# Patient Record
Sex: FEMALE | HISPANIC OR LATINO | Employment: OTHER | URBAN - METROPOLITAN AREA
[De-identification: names, ages, dates, MRNs, and addresses within clinical notes are randomized per-mention and may not be internally consistent; named-entity substitution may affect disease eponyms.]

---

## 2019-11-11 ENCOUNTER — APPOINTMENT (OUTPATIENT)
Dept: RADIOLOGY | Facility: MEDICAL CENTER | Age: 64
End: 2019-11-11
Attending: EMERGENCY MEDICINE
Payer: MEDICARE

## 2019-11-11 ENCOUNTER — HOSPITAL ENCOUNTER (EMERGENCY)
Facility: MEDICAL CENTER | Age: 64
End: 2019-11-11
Attending: EMERGENCY MEDICINE
Payer: MEDICARE

## 2019-11-11 VITALS
HEART RATE: 65 BPM | DIASTOLIC BLOOD PRESSURE: 101 MMHG | SYSTOLIC BLOOD PRESSURE: 148 MMHG | BODY MASS INDEX: 29.02 KG/M2 | OXYGEN SATURATION: 95 % | TEMPERATURE: 97.5 F | WEIGHT: 163.8 LBS | HEIGHT: 63 IN | RESPIRATION RATE: 16 BRPM

## 2019-11-11 DIAGNOSIS — S29.9XXA INJURY OF CHEST WALL, INITIAL ENCOUNTER: ICD-10-CM
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
## 2019-11-11 PROCEDURE — 99284 EMERGENCY DEPT VISIT MOD MDM: CPT

## 2019-11-11 PROCEDURE — 700102 HCHG RX REV CODE 250 W/ 637 OVERRIDE(OP): Performed by: EMERGENCY MEDICINE

## 2019-11-11 PROCEDURE — 71045 X-RAY EXAM CHEST 1 VIEW: CPT

## 2019-11-11 PROCEDURE — A9270 NON-COVERED ITEM OR SERVICE: HCPCS | Performed by: EMERGENCY MEDICINE

## 2019-11-11 RX ORDER — HYDROCODONE BITARTRATE AND ACETAMINOPHEN 5; 325 MG/1; MG/1
1 TABLET ORAL ONCE
Status: COMPLETED | OUTPATIENT
Start: 2019-11-11 | End: 2019-11-11

## 2019-11-11 RX ORDER — MELOXICAM 7.5 MG/1
7.5 TABLET ORAL DAILY
Qty: 30 TAB | Refills: 0 | Status: SHIPPED | OUTPATIENT
Start: 2019-11-11 | End: 2023-01-04

## 2019-11-11 RX ADMIN — HYDROCODONE BITARTRATE AND ACETAMINOPHEN 1 TABLET: 5; 325 TABLET ORAL at 16:03
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
## 2019-11-11 NOTE — ED PROVIDER NOTES
ED Provider Note    CHIEF COMPLAINT  Chief Complaint   Patient presents with   • T-5000 GLF     just got up from sleep and tripped on ottoman and fell onto chest, pain in right chest wall/breast       HPI  Roxy Sandhu is a 64 y.o. female who presents for evaluation after ground-level fall.  The patient is here with her .  She states she got out of bed and tripped on the ottoman falling and striking the right side of her chest.  She is complaining of right-sided chest wall pain.  She had no loss of consciousness.  She states she did not strike her head.    REVIEW OF SYSTEMS  See HPI for further details. All other systems negative.    PAST MEDICAL HISTORY  Past Medical History:   Diagnosis Date   • Breast cancer (HCC)    • Breath shortness    • Cancer (HCC) 2011    breast       FAMILY HISTORY  History reviewed. No pertinent family history.    SOCIAL HISTORY  Social History     Socioeconomic History   • Marital status: Single     Spouse name: Not on file   • Number of children: Not on file   • Years of education: Not on file   • Highest education level: Not on file   Occupational History   • Not on file   Social Needs   • Financial resource strain: Not on file   • Food insecurity:     Worry: Not on file     Inability: Not on file   • Transportation needs:     Medical: Not on file     Non-medical: Not on file   Tobacco Use   • Smoking status: Never Smoker   • Smokeless tobacco: Never Used   Substance and Sexual Activity   • Alcohol use: Yes     Comment: Occasional    • Drug use: No   • Sexual activity: Not on file   Lifestyle   • Physical activity:     Days per week: Not on file     Minutes per session: Not on file   • Stress: Not on file   Relationships   • Social connections:     Talks on phone: Not on file     Gets together: Not on file     Attends Sikh service: Not on file     Active member of club or organization: Not on file     Attends meetings of clubs or organizations: Not on file      "Relationship status: Not on file   • Intimate partner violence:     Fear of current or ex partner: Not on file     Emotionally abused: Not on file     Physically abused: Not on file     Forced sexual activity: Not on file   Other Topics Concern   • Not on file   Social History Narrative   • Not on file       SURGICAL HISTORY  Past Surgical History:   Procedure Laterality Date   • LUMPECTOMY  9/2/2011    Performed by DOREEN GRACIA at SURGERY SAME DAY [facility]   • NODE BIOPSY SENTINEL  9/2/2011    Performed by DOREEN GARCIA at SURGERY SAME DAY [facility]       CURRENT MEDICATIONS  Home Medications     Reviewed by Sam Armstrong R.N. (Registered Nurse) on 11/11/19 at 1523  Med List Status: Complete   Medication Last Dose Status        Patient Mason Taking any Medications                       ALLERGIES  Allergies   Allergen Reactions   • No Known Drug Allergy        PHYSICAL EXAM  VITAL SIGNS: /97   Pulse 83   Temp 36.4 °C (97.5 °F) (Temporal)   Resp 17   Ht 1.6 m (5' 3\")   Wt 74.3 kg (163 lb 12.8 oz)   SpO2 97%   BMI 29.02 kg/m²   Constitutional: Well developed, Well nourished.   HENT: Normocephalic, Atraumatic.  Eyes: EOMI, Conjunctiva normal, No discharge.   Cardiovascular: Normal heart rate, Normal rhythm, No murmurs, No rubs, No gallops.   Thorax & Lungs: Clear to auscultation without wheezes, rales, or rhonchi.  Tender to palpation of the right lateral chest wall with no crepitance noted.  Abdomen: Soft nontender.  Skin: Warm, Dry.  Musculoskeletal: Good range of motion in all major joints.   Neurologic: Awake and alert.  RADIOLOGY/PROCEDURES  DX-CHEST-PORTABLE (1 VIEW)   Final Result      No acute cardiopulmonary abnormality.            COURSE & MEDICAL DECISION MAKING  Pertinent Labs & Imaging studies reviewed. (See chart for details)  This is a 64-year-old here for evaluation after ground-level fall.  The patient tripped at home and fell injuring her right chest.  On exam she does " have some tenderness in the lateral chest wall.  No crepitance.  She is not hypoxemic.  She appears neurologically intact.  Her breath sounds appear full.  Chest x-ray shows no acute cardiopulmonary processes.  I discussed the results of the study with the patient and her .  She is treated with Norco here with improvement.  At this point I suspect she has a chest wall injury likely a bruise but possibility of a nondisplaced fracture exists.  I will provide her a prescription for meloxicam.  She will follow-up with her doctor as needed.  She is given a discharge instruction sheet on chest wall injuries.    FINAL IMPRESSION  1.  Mechanical ground-level fall  2.  Chest wall injury  3.         Electronically signed by: Junaid Laird, 11/11/2019 3:55 PM

## 2019-11-12 NOTE — ED NOTES
Given discharge instructions, verbalized understanding, left with all belongings, wheeled to ED lobby by .
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
## 2022-12-06 ENCOUNTER — HOSPITAL ENCOUNTER (OUTPATIENT)
Dept: RADIOLOGY | Facility: MEDICAL CENTER | Age: 67
End: 2022-12-06
Attending: OPHTHALMOLOGY
Payer: MEDICARE

## 2022-12-06 DIAGNOSIS — H47.013 ISCHEMIC OPTIC NEUROPATHY OF BOTH EYES: ICD-10-CM

## 2022-12-06 DIAGNOSIS — H46.13 RETROBULBAR NEURITIS OF BOTH EYES: ICD-10-CM

## 2022-12-06 PROCEDURE — 70540 MRI ORBIT/FACE/NECK W/O DYE: CPT

## 2022-12-21 ENCOUNTER — HOSPITAL ENCOUNTER (OUTPATIENT)
Dept: LAB | Facility: MEDICAL CENTER | Age: 67
End: 2022-12-21
Attending: OPHTHALMOLOGY
Payer: MEDICARE

## 2022-12-21 ENCOUNTER — OFFICE VISIT (OUTPATIENT)
Dept: OPHTHALMOLOGY | Facility: MEDICAL CENTER | Age: 67
End: 2022-12-21
Payer: MEDICARE

## 2022-12-21 DIAGNOSIS — H46.9 OPTIC NEURITIS: ICD-10-CM

## 2022-12-21 DIAGNOSIS — H46.9 OPTIC NEUROPATHY: ICD-10-CM

## 2022-12-21 DIAGNOSIS — Z96.1 PSEUDOPHAKIA OF LEFT EYE: ICD-10-CM

## 2022-12-21 DIAGNOSIS — H35.3190 AGE-RELATED MACULAR DEGENERATION WITH CENTRAL GEOGRAPHIC ATROPHY: ICD-10-CM

## 2022-12-21 LAB
ALBUMIN SERPL BCP-MCNC: 4.6 G/DL (ref 3.2–4.9)
ALBUMIN/GLOB SERPL: 1.1 G/DL
ALP SERPL-CCNC: 66 U/L (ref 30–99)
ALT SERPL-CCNC: 10 U/L (ref 2–50)
ANION GAP SERPL CALC-SCNC: 13 MMOL/L (ref 7–16)
AST SERPL-CCNC: 24 U/L (ref 12–45)
BASOPHILS # BLD AUTO: 0.4 % (ref 0–1.8)
BASOPHILS # BLD: 0.02 K/UL (ref 0–0.12)
BILIRUB SERPL-MCNC: 0.6 MG/DL (ref 0.1–1.5)
BUN SERPL-MCNC: 12 MG/DL (ref 8–22)
CALCIUM ALBUM COR SERPL-MCNC: 9.4 MG/DL (ref 8.5–10.5)
CALCIUM SERPL-MCNC: 9.9 MG/DL (ref 8.5–10.5)
CHLORIDE SERPL-SCNC: 102 MMOL/L (ref 96–112)
CO2 SERPL-SCNC: 25 MMOL/L (ref 20–33)
CREAT SERPL-MCNC: 1.01 MG/DL (ref 0.5–1.4)
CRP SERPL HS-MCNC: <0.3 MG/DL (ref 0–0.75)
EOSINOPHIL # BLD AUTO: 0.04 K/UL (ref 0–0.51)
EOSINOPHIL NFR BLD: 0.8 % (ref 0–6.9)
ERYTHROCYTE [DISTWIDTH] IN BLOOD BY AUTOMATED COUNT: 50.1 FL (ref 35.9–50)
ERYTHROCYTE [SEDIMENTATION RATE] IN BLOOD BY WESTERGREN METHOD: 30 MM/HOUR (ref 0–25)
GFR SERPLBLD CREATININE-BSD FMLA CKD-EPI: 62 ML/MIN/1.73 M 2
GLOBULIN SER CALC-MCNC: 4.2 G/DL (ref 1.9–3.5)
GLUCOSE SERPL-MCNC: 90 MG/DL (ref 65–99)
HCT VFR BLD AUTO: 40 % (ref 37–47)
HGB BLD-MCNC: 13 G/DL (ref 12–16)
IMM GRANULOCYTES # BLD AUTO: 0.02 K/UL (ref 0–0.11)
IMM GRANULOCYTES NFR BLD AUTO: 0.4 % (ref 0–0.9)
LYMPHOCYTES # BLD AUTO: 1.24 K/UL (ref 1–4.8)
LYMPHOCYTES NFR BLD: 23.4 % (ref 22–41)
MCH RBC QN AUTO: 31.9 PG (ref 27–33)
MCHC RBC AUTO-ENTMCNC: 32.5 G/DL (ref 33.6–35)
MCV RBC AUTO: 98.3 FL (ref 81.4–97.8)
MONOCYTES # BLD AUTO: 0.45 K/UL (ref 0–0.85)
MONOCYTES NFR BLD AUTO: 8.5 % (ref 0–13.4)
NEUTROPHILS # BLD AUTO: 3.54 K/UL (ref 2–7.15)
NEUTROPHILS NFR BLD: 66.5 % (ref 44–72)
NRBC # BLD AUTO: 0 K/UL
NRBC BLD-RTO: 0 /100 WBC
PLATELET # BLD AUTO: 240 K/UL (ref 164–446)
PMV BLD AUTO: 9.4 FL (ref 9–12.9)
POTASSIUM SERPL-SCNC: 4.3 MMOL/L (ref 3.6–5.5)
PROT SERPL-MCNC: 8.8 G/DL (ref 6–8.2)
RBC # BLD AUTO: 4.07 M/UL (ref 4.2–5.4)
SODIUM SERPL-SCNC: 140 MMOL/L (ref 135–145)
T PALLIDUM AB SER QL IA: NORMAL
WBC # BLD AUTO: 5.3 K/UL (ref 4.8–10.8)

## 2022-12-21 PROCEDURE — 86039 ANTINUCLEAR ANTIBODIES (ANA): CPT

## 2022-12-21 PROCEDURE — 82164 ANGIOTENSIN I ENZYME TEST: CPT

## 2022-12-21 PROCEDURE — 86362 MOG-IGG1 ANTB CBA EACH: CPT

## 2022-12-21 PROCEDURE — 86780 TREPONEMA PALLIDUM: CPT | Mod: GA

## 2022-12-21 PROCEDURE — 86051 AQUAPORIN-4 ANTB ELISA: CPT

## 2022-12-21 PROCEDURE — 92250 FUNDUS PHOTOGRAPHY W/I&R: CPT | Performed by: OPHTHALMOLOGY

## 2022-12-21 PROCEDURE — 99205 OFFICE O/P NEW HI 60 MIN: CPT | Mod: 25 | Performed by: OPHTHALMOLOGY

## 2022-12-21 PROCEDURE — 80053 COMPREHEN METABOLIC PANEL: CPT

## 2022-12-21 PROCEDURE — 84479 ASSAY OF THYROID (T3 OR T4): CPT | Mod: GA

## 2022-12-21 PROCEDURE — 86140 C-REACTIVE PROTEIN: CPT

## 2022-12-21 PROCEDURE — 36415 COLL VENOUS BLD VENIPUNCTURE: CPT | Mod: GA

## 2022-12-21 PROCEDURE — 85652 RBC SED RATE AUTOMATED: CPT

## 2022-12-21 PROCEDURE — 85025 COMPLETE CBC W/AUTO DIFF WBC: CPT

## 2022-12-21 PROCEDURE — 84436 ASSAY OF TOTAL THYROXINE: CPT | Mod: GA

## 2022-12-21 PROCEDURE — 86038 ANTINUCLEAR ANTIBODIES: CPT

## 2022-12-21 ASSESSMENT — REFRACTION_MANIFEST
OS_CYLINDER: +0.75
OD_AXIS: 070
METHOD_AUTOREFRACTION: 1
OS_SPHERE: -0.50
OD_SPHERE: +0.25
OS_AXIS: 132
OD_CYLINDER: +0.50

## 2022-12-21 ASSESSMENT — VISUAL ACUITY
OD_SC: NLP
OS_SC+: -3
METHOD: SNELLEN - LINEAR
OS_SC: 20/25

## 2022-12-21 ASSESSMENT — CONF VISUAL FIELD
OD_SUPERIOR_TEMPORAL_RESTRICTION: 1
OS_SUPERIOR_TEMPORAL_RESTRICTION: 0
OS_INFERIOR_TEMPORAL_RESTRICTION: 0
OS_NORMAL: 1
OD_INFERIOR_NASAL_RESTRICTION: 1
OS_SUPERIOR_NASAL_RESTRICTION: 0
OD_SUPERIOR_NASAL_RESTRICTION: 1
OS_INFERIOR_NASAL_RESTRICTION: 0
OD_INFERIOR_TEMPORAL_RESTRICTION: 1

## 2022-12-21 ASSESSMENT — SLIT LAMP EXAM - LIDS
COMMENTS: NORMAL
COMMENTS: NORMAL

## 2022-12-21 ASSESSMENT — TONOMETRY
OD_IOP_MMHG: 22
OS_IOP_MMHG: 22
IOP_METHOD: I-CARE

## 2022-12-21 ASSESSMENT — EXTERNAL EXAM - RIGHT EYE: OD_EXAM: NORMAL

## 2022-12-21 ASSESSMENT — ENCOUNTER SYMPTOMS
HEADACHES: 1
BLURRED VISION: 1

## 2022-12-21 ASSESSMENT — EXTERNAL EXAM - LEFT EYE: OS_EXAM: NORMAL

## 2022-12-21 ASSESSMENT — CUP TO DISC RATIO: OD_RATIO: 0.2

## 2022-12-21 NOTE — PROGRESS NOTES
Peds/Neuro Ophthalmology:   John Pantoja M.D.    Date & Time note created:    12/21/2022   4:10 PM     Referring MD / APRN:  Jagruti John D.O., No att. providers found    Patient ID:  Name:             Roxy Sandhu     YOB: 1955  Age:                 67 y.o.  female   MRN:               [id number]    Chief Complaint/Reason for Visit:     Other (New patient for NAION in the right eye)      History of Present Illness:    Roxy Sandhu is a 67 y.o. female   New patient for NAION in the right eye. Pt is with  today. Patient states vision in the left eye is stable after cataract surgery but the right eye she has no vision since 11/05/2022. Pt was told she possibly had a stroke. Pt has seen Retina Doctor at [facility]. Pt denies pain or discomfort in both eyes. Pt does have headaches mainly feels them on the side of head. Headaches is worse when she is cold.       Review of Systems:  Review of Systems   Eyes:  Positive for blurred vision.        NAION in the right eye   Neurological:  Positive for headaches.   All other systems reviewed and are negative.    Past Medical History:   Past Medical History:   Diagnosis Date    Breast cancer (HCC)     Breath shortness     Cancer (HCC) 2011    breast       Past Surgical History:  Past Surgical History:   Procedure Laterality Date    LUMPECTOMY  09/02/2011    Performed by DOREEN GARCIA at SURGERY SAME DAY [facility]    NODE BIOPSY SENTINEL  09/02/2011    Performed by DOREEN GARCIA at SURGERY SAME DAY [facility]    EYE SURGERY         Current Outpatient Medications:  Current Outpatient Medications   Medication Sig Dispense Refill    meloxicam (MOBIC) 7.5 MG Tab Take 1 Tab by mouth every day. (Patient not taking: Reported on 12/21/2022) 30 Tab 0     No current facility-administered medications for this visit.       Allergies:  Allergies   Allergen Reactions    No Known Drug Allergy        Family History:  Family  History   Problem Relation Age of Onset    Diabetes Mother        Social History:  Social History     Socioeconomic History    Marital status: Single     Spouse name: Not on file    Number of children: Not on file    Years of education: Not on file    Highest education level: Not on file   Occupational History    Not on file   Tobacco Use    Smoking status: Never    Smokeless tobacco: Never   Vaping Use    Vaping Use: Never used   Substance and Sexual Activity    Alcohol use: Not Currently     Comment: Occasional     Drug use: Not Currently     Types: Marijuana    Sexual activity: Not on file   Other Topics Concern    Not on file   Social History Narrative    disabled     Social Determinants of Health     Financial Resource Strain: Not on file   Food Insecurity: Not on file   Transportation Needs: Not on file   Physical Activity: Not on file   Stress: Not on file   Social Connections: Not on file   Intimate Partner Violence: Not on file   Housing Stability: Not on file          Physical Exam:  Physical Exam    Oriented x 3  Weight/BMI: There is no height or weight on file to calculate BMI.  There were no vitals taken for this visit.    Base Eye Exam       Visual Acuity (Snellen - Linear)         Right Left    Dist sc NLP 20/25 -3    Dist ph sc NI NI              Tonometry (i-care, 8:30 AM)         Right Left    Pressure 22 22              Pupils         Shape APD    Right Round +3    Left Round               Visual Fields         Right Left      Full                                Extraocular Movement         Right Left     Full Full              Neuro/Psych       Oriented x3: Yes    Mood/Affect: difficult historian              Dilation       Both eyes: Tropicamide (MYDRIACYL) 1% ophthalmic solution, Phenylephrine (NEOSYNEPHRINE) ophthalmic solution 2.5%, Cyclopentolate (CYCLOGYL) 1% ophthalmic solution                   Additional Tests       Color         Right Left    Ishihara 0/12 12/12              Stereo        Fly: -    Animals: 0/3    Circles: 0/9                  Slit Lamp and Fundus Exam       External Exam         Right Left    External Normal Normal              Slit Lamp Exam         Right Left    Lids/Lashes Normal Normal    Conjunctiva/Sclera White and quiet White and quiet    Cornea Clear Clear    Anterior Chamber Deep and quiet Deep and quiet    Iris Round and reactive Round and reactive    Lens Clear Posterior chamber intraocular lens    Vitreous Normal Normal              Fundus Exam         Right Left    Disc Normal Tilted, with chichi pap staphyloma    C/D Ratio 0.2     Macula Normal RPE degeneration    Vessels Normal Normal    Periphery Normal Normal                  Refraction       Manifest Refraction (Auto)         Sphere Cylinder Axis    Right +0.25 +0.50 070    Left -0.50 +0.75 132                    Pertinent Lab/Test/Imaging Review:  Notes from [facility].  Review of MRI reports and images    Assessment and Plan:     Optic neuritis  12/21/2022-referred by [facility] for acute vision loss in the right November 5.  She was then seen at [facility] as well by Dr. 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  However he did not notice any disc edema.  She did undergo an MRI scan that was unfortunately done without contrast that demonstrated some optic nerve sheath dilation.  On examination today her visual acuity is light perception in the right eye and there is a right a fair pupillary defect.  An OCT of the optic nerve thickness is normal at 89 in the right and 72 in the left with normal ganglion cell on the right.  Therefore this history is not consistent with a nonarteritic ischemic optic neuropathy and that went over the expected continued swelling noted on dilated examination 10 days later.  This could easily be consistent with a an acute central retinal artery occlusion.  Trevor York did a fluorescein  angiogram but I do not have any results to that effect.  However given the optic nerve sheath dilation on MRI scan this could be consistent with a retrobulbar optic neuritis.  However the MRI scan was done without contrast so one does not know where the optic nerve would enhance.  I am therefore we are ordering an MRI scan with gadolinium as well as ordering routine blood work for which she has not done in a while.  Also to obtain further serological testing as well as CT angiogram of the head and neck.  Sending a note back to Dr. York insofar as if he never did a fluorescein angiogram this might help in securing a diagnosis.    Pseudophakia of left eye  12/21/2022-pseudophakia left eye.  IOL intact    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  12/21/2022-significantly tilted optic nerve on the left with geographic atrophy.  OCT demonstrates significant RPE changes as well as macular hole.  We will relay this information to Dr. York.  Uncertain as to whether highly myopic prior to cataract extraction on the left    Greater than 60 minute were spent examining the patient, reviewing records from referring providers including MRI images if available and records within the [facility]IC system, counselling the patient and family regarding the examination findings, diagnostic testing preformed, recommendations for management, prognosis, further testing and referrals as appropriate and follow up. This in addition to time spent interpreting separate billable tests done during the visit.      John Pantoja M.D.

## 2022-12-22 ENCOUNTER — HOSPITAL ENCOUNTER (OUTPATIENT)
Dept: RADIOLOGY | Facility: MEDICAL CENTER | Age: 67
End: 2022-12-22
Attending: OPHTHALMOLOGY
Payer: MEDICARE

## 2022-12-22 ENCOUNTER — HOSPITAL ENCOUNTER (OUTPATIENT)
Dept: LAB | Facility: MEDICAL CENTER | Age: 67
End: 2022-12-22
Attending: OPHTHALMOLOGY
Payer: MEDICARE

## 2022-12-22 DIAGNOSIS — H46.9 OPTIC NEUROPATHY: ICD-10-CM

## 2022-12-22 DIAGNOSIS — R93.0 ABNORMAL FINDINGS ON DX IMAGING OF SKULL AND HEAD, NEC: ICD-10-CM

## 2022-12-22 DIAGNOSIS — H46.9 OPTIC NEURITIS: ICD-10-CM

## 2022-12-22 LAB
ACE SERPL-CCNC: 39 U/L (ref 16–85)
ALBUMIN SERPL BCP-MCNC: 4.6 G/DL (ref 3.2–4.9)
ALBUMIN/GLOB SERPL: 1.2 G/DL
ALP SERPL-CCNC: 62 U/L (ref 30–99)
ALT SERPL-CCNC: 17 U/L (ref 2–50)
ANION GAP SERPL CALC-SCNC: 15 MMOL/L (ref 7–16)
AST SERPL-CCNC: 35 U/L (ref 12–45)
BILIRUB SERPL-MCNC: 0.5 MG/DL (ref 0.1–1.5)
BUN SERPL-MCNC: 12 MG/DL (ref 8–22)
CALCIUM ALBUM COR SERPL-MCNC: 8.7 MG/DL (ref 8.5–10.5)
CALCIUM SERPL-MCNC: 9.2 MG/DL (ref 8.4–10.2)
CHLORIDE SERPL-SCNC: 100 MMOL/L (ref 96–112)
CO2 SERPL-SCNC: 21 MMOL/L (ref 20–33)
CREAT SERPL-MCNC: 0.89 MG/DL (ref 0.5–1.4)
FASTING STATUS PATIENT QL REPORTED: NORMAL
FT4I SERPL CALC-MCNC: 0.2 UNITS (ref 1.7–4.2)
GFR SERPLBLD CREATININE-BSD FMLA CKD-EPI: 72 ML/MIN/1.73 M 2
GLOBULIN SER CALC-MCNC: 4 G/DL (ref 1.9–3.5)
GLUCOSE SERPL-MCNC: 100 MG/DL (ref 65–99)
POTASSIUM SERPL-SCNC: 4.2 MMOL/L (ref 3.6–5.5)
PROT SERPL-MCNC: 8.6 G/DL (ref 6–8.2)
SODIUM SERPL-SCNC: 136 MMOL/L (ref 135–145)
T3RU NFR SERPL: 24 % (ref 28–41)
T4 SERPL-MCNC: 0.86 UG/DL (ref 4.5–11.7)

## 2022-12-22 PROCEDURE — 70543 MRI ORBT/FAC/NCK W/O &W/DYE: CPT

## 2022-12-22 PROCEDURE — A9576 INJ PROHANCE MULTIPACK: HCPCS

## 2022-12-22 PROCEDURE — 700117 HCHG RX CONTRAST REV CODE 255

## 2022-12-22 PROCEDURE — 80053 COMPREHEN METABOLIC PANEL: CPT

## 2022-12-22 PROCEDURE — 36415 COLL VENOUS BLD VENIPUNCTURE: CPT

## 2022-12-22 RX ADMIN — GADOTERIDOL 15 ML: 279.3 INJECTION, SOLUTION INTRAVENOUS at 09:26

## 2022-12-22 NOTE — ASSESSMENT & PLAN NOTE
12/21/2022-referred by [facility] for acute vision loss in the right November 5.  She was then seen at [facility] as well by Dr. 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  However he did not notice any disc edema.  She did undergo an MRI scan that was unfortunately done without contrast that demonstrated some optic nerve sheath dilation.  On examination today her visual acuity is light perception in the right eye and there is a right a fair pupillary defect.  An OCT of the optic nerve thickness is normal at 89 in the right and 72 in the left with normal ganglion cell on the right.  Therefore this history is not consistent with a nonarteritic ischemic optic neuropathy and that went over the expected continued swelling noted on dilated examination 10 days later.  This could easily be consistent with a an acute central retinal artery occlusion.  Trevor York did a fluorescein angiogram but I do not have any results to that effect.  However given the optic nerve sheath dilation on MRI scan this could be consistent with a retrobulbar optic neuritis.  However the MRI scan was done without contrast so one does not know where the optic nerve would enhance.  I am therefore we are ordering an MRI scan with gadolinium as well as ordering routine blood work for which she has not done in a while.  Also to obtain further serological testing as well as CT angiogram of the head and neck.  Sending a note back to Dr. York insofar as if he never did a fluorescein angiogram this might help in securing a diagnosis.

## 2022-12-22 NOTE — ASSESSMENT & PLAN NOTE
12/21/2022-significantly tilted optic nerve on the left with geographic atrophy.  OCT demonstrates significant RPE changes as well as macular hole.  We will relay this information to Dr. York.  Uncertain as to whether highly myopic prior to cataract extraction on the left

## 2022-12-22 NOTE — PROCEDURES
NFL thickness 89 OD 72 OS.  Significant geographic atrophy involving the macula as well as a macular hole in the left

## 2022-12-22 NOTE — PROCEDURES
Significant atrophy of the right optic nerve.  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

## 2022-12-23 LAB
AQP4 H2O CHANNEL AB SERPL IA-ACNC: 28.2 U/ML
MOG AB SER QL CBA IFA: NORMAL
NUCLEAR IGG SER QL IA: DETECTED

## 2022-12-24 LAB
ANA INTERPRETIVE COMMENT Q5143: ABNORMAL
ANA PATTERN Q5144: ABNORMAL
ANA TITER Q5145: ABNORMAL
ANTINUCLEAR ANTIBODY (ANA), HEP-2, IGG Q5142: DETECTED

## 2023-01-03 ENCOUNTER — DOCUMENTATION (OUTPATIENT)
Dept: OPHTHALMOLOGY | Facility: MEDICAL CENTER | Age: 68
End: 2023-01-03
Payer: MEDICARE

## 2023-01-03 DIAGNOSIS — H46.9 OPTIC NEURITIS: ICD-10-CM

## 2023-01-03 NOTE — PROGRESS NOTES
12/21/2022-referred by [facility] for acute vision loss in the right November 5.  She was then seen at [facility] as well by Dr. 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  However he did not notice any disc edema.  She did undergo an MRI scan that was unfortunately done without contrast that demonstrated some optic nerve sheath dilation.  On examination today her visual acuity is light perception in the right eye and there is a right a fair pupillary defect.  An OCT of the optic nerve thickness is normal at 89 in the right and 72 in the left with normal ganglion cell on the right.  Therefore this history is not consistent with a nonarteritic ischemic optic neuropathy and that went over the expected continued swelling noted on dilated examination 10 days later.  This could easily be consistent with a an acute central retinal artery occlusion.  Trevor York did a fluorescein angiogram but I do not have any results to that effect.  However given the optic nerve sheath dilation on MRI scan this could be consistent with a retrobulbar optic neuritis.  However the MRI scan was done without contrast so one does not know where the optic nerve would enhance.  I am therefore we are ordering an MRI scan with gadolinium as well as ordering routine blood work for which she has not done in a while.  Also to obtain further serological testing as well as CT angiogram of the head and neck.  Sending a note back to Dr. York insofar as if he never did a fluorescein angiogram this might help in securing a diagnosis.  1/3/2022 - Review of MRI and labs thus far. MRI demonstrates enhancement to the right optic nerve. MOG - negative, but aquaporin 4 -0 elevated. ACE normal but FELIX extremely high at 1:2560. T4, T3 and Free T4 all low (no TSH). Thus discussed at length with patient. Since still enhancement of the optic nerve would recommend admission to [facility]  for at least 3 days of high dose IV solumedrol 1 gm per day. Needs TSH, connective tissue disease profile, lupus anticoagulant, anti phospholipid antibody, (Rheumatology Consult) PT, PTT, MRI cervical, thoracis and lumbar spine with contrast looking for other enhancing lesion, Lumbar puncture with cytology (history of breast CA), oligoclonal bands and myelin basic protein (Neurologiy consult). If no improvement in the vision on the IV solumedrol and work up pointing to a demyelinating disease then should consider IVIG or PLEX. Discussed with patient. Is getting CTA head and neck tomoroow late morning so recommended that after she present to the [facility] emergency room to initiate work up and get admitted.

## 2023-01-03 NOTE — ASSESSMENT & PLAN NOTE
12/21/2022-referred by [facility] for acute vision loss in the right November 5.  She was then seen at [facility] as well by Dr. 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  However he did not notice any disc edema.  She did undergo an MRI scan that was unfortunately done without contrast that demonstrated some optic nerve sheath dilation.  On examination today her visual acuity is light perception in the right eye and there is a right a fair pupillary defect.  An OCT of the optic nerve thickness is normal at 89 in the right and 72 in the left with normal ganglion cell on the right.  Therefore this history is not consistent with a nonarteritic ischemic optic neuropathy and that went over the expected continued swelling noted on dilated examination 10 days later.  This could easily be consistent with a an acute central retinal artery occlusion.  Trevor York did a fluorescein angiogram but I do not have any results to that effect.  However given the optic nerve sheath dilation on MRI scan this could be consistent with a retrobulbar optic neuritis.  However the MRI scan was done without contrast so one does not know where the optic nerve would enhance.  I am therefore we are ordering an MRI scan with gadolinium as well as ordering routine blood work for which she has not done in a while.  Also to obtain further serological testing as well as CT angiogram of the head and neck.  Sending a note back to Dr. York insofar as if he never did a fluorescein angiogram this might help in securing a diagnosis.  1/3/2022 - Review of MRI and labs thus far. MRI demonstrates enhancement to the right optic nerve. MOG - negative, but aquaporin 4 -0 elevated. ACE normal but FELIX extremely high at 1:2560. T4, T3 and Free T4 all low (no TSH). Thus discussed at length with patient. Since still enhancement of the optic nerve would recommend admission to [facility]  for at least 3 days of high dose IV solumedrol 1 gm per day. Needs TSH, connective tissue disease profile, lupus anticoagulant, anti phospholipid antibody, (Rheumatology Consult) PT, PTT, MRI cervical, thoracis and lumbar spine with contrast looking for other enhancing lesion, Lumbar puncture with cytology (history of breast CA), oligoclonal bands and myelin basic protein (Neurologiy consult). If no improvement in the vision on the IV solumedrol and work up pointing to a demyelinating disease then should consider IVIG or PLEX. Discussed with patient. Is getting CTA head and neck tomoroow late morning so recommended that after she present to the [facility] emergency room to initiate work up and get admitted.

## 2023-01-04 ENCOUNTER — APPOINTMENT (OUTPATIENT)
Dept: RADIOLOGY | Facility: MEDICAL CENTER | Age: 68
DRG: 060 | End: 2023-01-04
Attending: OPHTHALMOLOGY
Payer: MEDICARE

## 2023-01-04 ENCOUNTER — HOSPITAL ENCOUNTER (INPATIENT)
Facility: MEDICAL CENTER | Age: 68
LOS: 3 days | DRG: 060 | End: 2023-01-07
Attending: EMERGENCY MEDICINE | Admitting: HOSPITALIST
Payer: MEDICARE

## 2023-01-04 DIAGNOSIS — H54.61 VISION LOSS OF RIGHT EYE: ICD-10-CM

## 2023-01-04 DIAGNOSIS — F43.9 STRESS AT HOME: ICD-10-CM

## 2023-01-04 DIAGNOSIS — E06.3 HYPOTHYROIDISM DUE TO HASHIMOTO'S THYROIDITIS: ICD-10-CM

## 2023-01-04 DIAGNOSIS — H46.9 OPTIC NEUROPATHY: ICD-10-CM

## 2023-01-04 DIAGNOSIS — E03.8 OTHER SPECIFIED HYPOTHYROIDISM: ICD-10-CM

## 2023-01-04 DIAGNOSIS — H46.9 OPTIC NEURITIS, RIGHT: ICD-10-CM

## 2023-01-04 DIAGNOSIS — H46.9 OPTIC NEURITIS: ICD-10-CM

## 2023-01-04 DIAGNOSIS — Z85.3 HISTORY OF BREAST CANCER: ICD-10-CM

## 2023-01-04 DIAGNOSIS — E03.8 HYPOTHYROIDISM DUE TO HASHIMOTO'S THYROIDITIS: ICD-10-CM

## 2023-01-04 LAB
ALBUMIN SERPL BCP-MCNC: 4.3 G/DL (ref 3.2–4.9)
ALBUMIN/GLOB SERPL: 1.2 G/DL
ALP SERPL-CCNC: 54 U/L (ref 30–99)
ALT SERPL-CCNC: 13 U/L (ref 2–50)
ANION GAP SERPL CALC-SCNC: 12 MMOL/L (ref 7–16)
APTT PPP: 33.2 SEC (ref 24.7–36)
AST SERPL-CCNC: 24 U/L (ref 12–45)
BASOPHILS # BLD AUTO: 0.6 % (ref 0–1.8)
BASOPHILS # BLD: 0.03 K/UL (ref 0–0.12)
BILIRUB SERPL-MCNC: 0.5 MG/DL (ref 0.1–1.5)
BUN SERPL-MCNC: 15 MG/DL (ref 8–22)
CALCIUM ALBUM COR SERPL-MCNC: 9.2 MG/DL (ref 8.5–10.5)
CALCIUM SERPL-MCNC: 9.4 MG/DL (ref 8.5–10.5)
CHLORIDE SERPL-SCNC: 103 MMOL/L (ref 96–112)
CO2 SERPL-SCNC: 22 MMOL/L (ref 20–33)
CREAT SERPL-MCNC: 0.96 MG/DL (ref 0.5–1.4)
EOSINOPHIL # BLD AUTO: 0.05 K/UL (ref 0–0.51)
EOSINOPHIL NFR BLD: 1 % (ref 0–6.9)
ERYTHROCYTE [DISTWIDTH] IN BLOOD BY AUTOMATED COUNT: 51.7 FL (ref 35.9–50)
GFR SERPLBLD CREATININE-BSD FMLA CKD-EPI: 66 ML/MIN/1.73 M 2
GLOBULIN SER CALC-MCNC: 3.7 G/DL (ref 1.9–3.5)
GLUCOSE BLD STRIP.AUTO-MCNC: 125 MG/DL (ref 65–99)
GLUCOSE SERPL-MCNC: 100 MG/DL (ref 65–99)
HCT VFR BLD AUTO: 37.3 % (ref 37–47)
HGB BLD-MCNC: 12.3 G/DL (ref 12–16)
IMM GRANULOCYTES # BLD AUTO: 0.02 K/UL (ref 0–0.11)
IMM GRANULOCYTES NFR BLD AUTO: 0.4 % (ref 0–0.9)
INR PPP: 1.04 (ref 0.87–1.13)
LYMPHOCYTES # BLD AUTO: 1.33 K/UL (ref 1–4.8)
LYMPHOCYTES NFR BLD: 25.4 % (ref 22–41)
MCH RBC QN AUTO: 32.4 PG (ref 27–33)
MCHC RBC AUTO-ENTMCNC: 33 G/DL (ref 33.6–35)
MCV RBC AUTO: 98.2 FL (ref 81.4–97.8)
MONOCYTES # BLD AUTO: 0.33 K/UL (ref 0–0.85)
MONOCYTES NFR BLD AUTO: 6.3 % (ref 0–13.4)
NEUTROPHILS # BLD AUTO: 3.48 K/UL (ref 2–7.15)
NEUTROPHILS NFR BLD: 66.3 % (ref 44–72)
NRBC # BLD AUTO: 0 K/UL
NRBC BLD-RTO: 0 /100 WBC
PLATELET # BLD AUTO: 205 K/UL (ref 164–446)
PMV BLD AUTO: 9.6 FL (ref 9–12.9)
POTASSIUM SERPL-SCNC: 4.2 MMOL/L (ref 3.6–5.5)
PROT SERPL-MCNC: 8 G/DL (ref 6–8.2)
PROTHROMBIN TIME: 13.5 SEC (ref 12–14.6)
RBC # BLD AUTO: 3.8 M/UL (ref 4.2–5.4)
SODIUM SERPL-SCNC: 137 MMOL/L (ref 135–145)
WBC # BLD AUTO: 5.2 K/UL (ref 4.8–10.8)

## 2023-01-04 PROCEDURE — 72156 MRI NECK SPINE W/O & W/DYE: CPT

## 2023-01-04 PROCEDURE — 770001 HCHG ROOM/CARE - MED/SURG/GYN PRIV*

## 2023-01-04 PROCEDURE — 85730 THROMBOPLASTIN TIME PARTIAL: CPT

## 2023-01-04 PROCEDURE — 85610 PROTHROMBIN TIME: CPT | Mod: 91

## 2023-01-04 PROCEDURE — 80053 COMPREHEN METABOLIC PANEL: CPT

## 2023-01-04 PROCEDURE — 72157 MRI CHEST SPINE W/O & W/DYE: CPT

## 2023-01-04 PROCEDURE — 70498 CT ANGIOGRAPHY NECK: CPT

## 2023-01-04 PROCEDURE — 84439 ASSAY OF FREE THYROXINE: CPT

## 2023-01-04 PROCEDURE — 99285 EMERGENCY DEPT VISIT HI MDM: CPT

## 2023-01-04 PROCEDURE — 85520 HEPARIN ASSAY: CPT

## 2023-01-04 PROCEDURE — 700117 HCHG RX CONTRAST REV CODE 255: Performed by: OPHTHALMOLOGY

## 2023-01-04 PROCEDURE — 84443 ASSAY THYROID STIM HORMONE: CPT

## 2023-01-04 PROCEDURE — A9579 GAD-BASE MR CONTRAST NOS,1ML: HCPCS | Performed by: OPHTHALMOLOGY

## 2023-01-04 PROCEDURE — 85613 RUSSELL VIPER VENOM DILUTED: CPT

## 2023-01-04 PROCEDURE — 36415 COLL VENOUS BLD VENIPUNCTURE: CPT

## 2023-01-04 PROCEDURE — 96365 THER/PROPH/DIAG IV INF INIT: CPT

## 2023-01-04 PROCEDURE — 86235 NUCLEAR ANTIGEN ANTIBODY: CPT | Mod: 91

## 2023-01-04 PROCEDURE — 70496 CT ANGIOGRAPHY HEAD: CPT

## 2023-01-04 PROCEDURE — 700111 HCHG RX REV CODE 636 W/ 250 OVERRIDE (IP): Performed by: EMERGENCY MEDICINE

## 2023-01-04 PROCEDURE — 72158 MRI LUMBAR SPINE W/O & W/DYE: CPT

## 2023-01-04 PROCEDURE — 83516 IMMUNOASSAY NONANTIBODY: CPT

## 2023-01-04 PROCEDURE — 99223 1ST HOSP IP/OBS HIGH 75: CPT | Performed by: HOSPITALIST

## 2023-01-04 PROCEDURE — 85025 COMPLETE CBC W/AUTO DIFF WBC: CPT

## 2023-01-04 PROCEDURE — 700105 HCHG RX REV CODE 258: Performed by: EMERGENCY MEDICINE

## 2023-01-04 PROCEDURE — 82962 GLUCOSE BLOOD TEST: CPT

## 2023-01-04 PROCEDURE — 86147 CARDIOLIPIN ANTIBODY EA IG: CPT | Mod: 91

## 2023-01-04 RX ORDER — AMOXICILLIN 250 MG
2 CAPSULE ORAL 2 TIMES DAILY
Status: DISCONTINUED | OUTPATIENT
Start: 2023-01-04 | End: 2023-01-07 | Stop reason: HOSPADM

## 2023-01-04 RX ORDER — ACETAMINOPHEN 325 MG/1
650 TABLET ORAL EVERY 6 HOURS PRN
Status: DISCONTINUED | OUTPATIENT
Start: 2023-01-04 | End: 2023-01-07 | Stop reason: HOSPADM

## 2023-01-04 RX ORDER — POLYETHYLENE GLYCOL 3350 17 G/17G
1 POWDER, FOR SOLUTION ORAL
Status: DISCONTINUED | OUTPATIENT
Start: 2023-01-04 | End: 2023-01-07 | Stop reason: HOSPADM

## 2023-01-04 RX ORDER — METHYLPREDNISOLONE SODIUM SUCCINATE 500 MG/8ML
1000 INJECTION INTRAMUSCULAR; INTRAVENOUS ONCE
Status: DISCONTINUED | OUTPATIENT
Start: 2023-01-04 | End: 2023-01-04

## 2023-01-04 RX ORDER — ONDANSETRON 4 MG/1
4 TABLET, ORALLY DISINTEGRATING ORAL EVERY 4 HOURS PRN
Status: DISCONTINUED | OUTPATIENT
Start: 2023-01-04 | End: 2023-01-07 | Stop reason: HOSPADM

## 2023-01-04 RX ORDER — METHYLPREDNISOLONE SODIUM SUCCINATE 40 MG/ML
1000 INJECTION, POWDER, LYOPHILIZED, FOR SOLUTION INTRAMUSCULAR; INTRAVENOUS DAILY
Status: DISCONTINUED | OUTPATIENT
Start: 2023-01-05 | End: 2023-01-04

## 2023-01-04 RX ORDER — ONDANSETRON 2 MG/ML
4 INJECTION INTRAMUSCULAR; INTRAVENOUS EVERY 4 HOURS PRN
Status: DISCONTINUED | OUTPATIENT
Start: 2023-01-04 | End: 2023-01-07 | Stop reason: HOSPADM

## 2023-01-04 RX ORDER — BISACODYL 10 MG
10 SUPPOSITORY, RECTAL RECTAL
Status: DISCONTINUED | OUTPATIENT
Start: 2023-01-04 | End: 2023-01-07 | Stop reason: HOSPADM

## 2023-01-04 RX ADMIN — GADOTERIDOL 15 ML: 279.3 INJECTION, SOLUTION INTRAVENOUS at 20:29

## 2023-01-04 RX ADMIN — SODIUM CHLORIDE 1000 MG: 9 INJECTION, SOLUTION INTRAVENOUS at 17:51

## 2023-01-04 RX ADMIN — IOHEXOL 100 ML: 350 INJECTION, SOLUTION INTRAVENOUS at 13:52

## 2023-01-04 ASSESSMENT — LIFESTYLE VARIABLES
ON A TYPICAL DAY WHEN YOU DRINK ALCOHOL HOW MANY DRINKS DO YOU HAVE: 0
ALCOHOL_USE: NO
CONSUMPTION TOTAL: NEGATIVE
DO YOU DRINK ALCOHOL: NO
HAVE PEOPLE ANNOYED YOU BY CRITICIZING YOUR DRINKING: NO
AVERAGE NUMBER OF DAYS PER WEEK YOU HAVE A DRINK CONTAINING ALCOHOL: 0
TOTAL SCORE: 0
TOTAL SCORE: 0
EVER FELT BAD OR GUILTY ABOUT YOUR DRINKING: NO
EVER HAD A DRINK FIRST THING IN THE MORNING TO STEADY YOUR NERVES TO GET RID OF A HANGOVER: NO
TOTAL SCORE: 0
HAVE YOU EVER FELT YOU SHOULD CUT DOWN ON YOUR DRINKING: NO
HOW MANY TIMES IN THE PAST YEAR HAVE YOU HAD 5 OR MORE DRINKS IN A DAY: 0

## 2023-01-04 ASSESSMENT — ENCOUNTER SYMPTOMS
WEAKNESS: 0
CHILLS: 0
FOCAL WEAKNESS: 0
FEVER: 0
COUGH: 0
SPEECH CHANGE: 0
SENSORY CHANGE: 0

## 2023-01-04 ASSESSMENT — PATIENT HEALTH QUESTIONNAIRE - PHQ9
SUM OF ALL RESPONSES TO PHQ9 QUESTIONS 1 AND 2: 6
8. MOVING OR SPEAKING SO SLOWLY THAT OTHER PEOPLE COULD HAVE NOTICED. OR THE OPPOSITE, BEING SO FIGETY OR RESTLESS THAT YOU HAVE BEEN MOVING AROUND A LOT MORE THAN USUAL: NOT AT ALL
6. FEELING BAD ABOUT YOURSELF - OR THAT YOU ARE A FAILURE OR HAVE LET YOURSELF OR YOUR FAMILY DOWN: NEARLY EVERY DAY
9. THOUGHTS THAT YOU WOULD BE BETTER OFF DEAD, OR OF HURTING YOURSELF: NOT AT ALL
5. POOR APPETITE OR OVEREATING: NOT AT ALL
SUM OF ALL RESPONSES TO PHQ QUESTIONS 1-9: 9
4. FEELING TIRED OR HAVING LITTLE ENERGY: NOT AT ALL
1. LITTLE INTEREST OR PLEASURE IN DOING THINGS: NEARLY EVERY DAY
3. TROUBLE FALLING OR STAYING ASLEEP OR SLEEPING TOO MUCH: NOT AT ALL
2. FEELING DOWN, DEPRESSED, IRRITABLE, OR HOPELESS: NEARLY EVERY DAY
7. TROUBLE CONCENTRATING ON THINGS, SUCH AS READING THE NEWSPAPER OR WATCHING TELEVISION: NOT AT ALL

## 2023-01-04 ASSESSMENT — PAIN DESCRIPTION - PAIN TYPE: TYPE: CHRONIC PAIN

## 2023-01-04 ASSESSMENT — FIBROSIS 4 INDEX: FIB4 SCORE: 2.3

## 2023-01-04 NOTE — ED NOTES
"Chief Complaint   Patient presents with    Sent by MD     Opthamology    Loss of Vision     To right eye on November 5, 2022     BP (!) 143/101   Pulse 82   Temp 37.1 °C (98.8 °F) (Temporal)   Resp 16   Ht 1.6 m (5' 3\")   Wt 70.2 kg (154 lb 12.2 oz)   SpO2 94%   BMI 27.42 kg/m²     Patient ambulatory to triage for above, sent by Dr. Pantoja, CT and CTA done outpatient today, patient denies pain at this time, educated on triage process, placed in lobby with , told to inform staff of any changes in condition.    "

## 2023-01-05 PROBLEM — F43.9 STRESS AT HOME: Status: ACTIVE | Noted: 2023-01-05

## 2023-01-05 PROBLEM — G60.9 IDIOPATHIC PERIPHERAL NEUROPATHY: Status: ACTIVE | Noted: 2023-01-05

## 2023-01-05 PROBLEM — G44.89 OTHER HEADACHE SYNDROME: Status: ACTIVE | Noted: 2023-01-05

## 2023-01-05 LAB
APTT PPP: 35.3 SEC (ref 24.7–36)
BURR CELLS/RBC NFR CSF MANUAL: 0 %
CLARITY CSF: CLEAR
COLOR CSF: COLORLESS
COLOR SPUN CSF: COLORLESS
CORTIS SERPL-MCNC: 84.1 UG/DL (ref 0–23)
GLUCOSE BLD STRIP.AUTO-MCNC: 125 MG/DL (ref 65–99)
GLUCOSE BLD STRIP.AUTO-MCNC: 145 MG/DL (ref 65–99)
GLUCOSE CSF-MCNC: 89 MG/DL (ref 40–80)
GRAM STN SPEC: NORMAL
INR PPP: 1.07 (ref 0.87–1.13)
LA PPP-IMP: NORMAL
LMWH PPP CHRO-ACNC: <0.1 U/ML
PROT CSF-MCNC: 69 MG/DL (ref 15–45)
PROTHROMBIN TIME: 13.8 SEC (ref 12–14.6)
RBC # CSF: 4 CELLS/UL
SCREEN DRVVT: 38.2 SEC (ref 28–48)
SIGNIFICANT IND 70042: NORMAL
SITE SITE: NORMAL
SOURCE SOURCE: NORMAL
SPECIMEN VOL CSF: 21 ML
T3FREE SERPL-MCNC: 0.7 PG/ML (ref 2–4.4)
T4 FREE SERPL-MCNC: <0.11 NG/DL (ref 0.93–1.7)
TSH SERPL DL<=0.005 MIU/L-ACNC: 123 UIU/ML (ref 0.38–5.33)
TUBE # CSF: 3
TUBE # CSF: 4
VIT B12 SERPL-MCNC: 627 PG/ML (ref 211–911)
WBC # CSF: 2 CELLS/UL (ref 0–10)

## 2023-01-05 PROCEDURE — 62270 DX LMBR SPI PNXR: CPT

## 2023-01-05 PROCEDURE — 82945 GLUCOSE OTHER FLUID: CPT

## 2023-01-05 PROCEDURE — 83916 OLIGOCLONAL BANDS: CPT

## 2023-01-05 PROCEDURE — 89051 BODY FLUID CELL COUNT: CPT

## 2023-01-05 PROCEDURE — 82607 VITAMIN B-12: CPT

## 2023-01-05 PROCEDURE — 700101 HCHG RX REV CODE 250: Performed by: STUDENT IN AN ORGANIZED HEALTH CARE EDUCATION/TRAINING PROGRAM

## 2023-01-05 PROCEDURE — 99233 SBSQ HOSP IP/OBS HIGH 50: CPT | Mod: 25,GC | Performed by: STUDENT IN AN ORGANIZED HEALTH CARE EDUCATION/TRAINING PROGRAM

## 2023-01-05 PROCEDURE — 82962 GLUCOSE BLOOD TEST: CPT | Mod: 91

## 2023-01-05 PROCEDURE — 009U3ZX DRAINAGE OF SPINAL CANAL, PERCUTANEOUS APPROACH, DIAGNOSTIC: ICD-10-PCS | Performed by: STUDENT IN AN ORGANIZED HEALTH CARE EDUCATION/TRAINING PROGRAM

## 2023-01-05 PROCEDURE — 36415 COLL VENOUS BLD VENIPUNCTURE: CPT

## 2023-01-05 PROCEDURE — A9270 NON-COVERED ITEM OR SERVICE: HCPCS | Performed by: STUDENT IN AN ORGANIZED HEALTH CARE EDUCATION/TRAINING PROGRAM

## 2023-01-05 PROCEDURE — 82533 TOTAL CORTISOL: CPT

## 2023-01-05 PROCEDURE — 83873 ASSAY OF CSF PROTEIN: CPT

## 2023-01-05 PROCEDURE — 84157 ASSAY OF PROTEIN OTHER: CPT

## 2023-01-05 PROCEDURE — 84439 ASSAY OF FREE THYROXINE: CPT

## 2023-01-05 PROCEDURE — 87205 SMEAR GRAM STAIN: CPT

## 2023-01-05 PROCEDURE — 86235 NUCLEAR ANTIGEN ANTIBODY: CPT | Mod: 91

## 2023-01-05 PROCEDURE — 700111 HCHG RX REV CODE 636 W/ 250 OVERRIDE (IP): Performed by: HOSPITALIST

## 2023-01-05 PROCEDURE — 84481 FREE ASSAY (FT-3): CPT

## 2023-01-05 PROCEDURE — 770001 HCHG ROOM/CARE - MED/SURG/GYN PRIV*

## 2023-01-05 PROCEDURE — 83516 IMMUNOASSAY NONANTIBODY: CPT

## 2023-01-05 PROCEDURE — 700105 HCHG RX REV CODE 258: Performed by: HOSPITALIST

## 2023-01-05 PROCEDURE — 62270 DX LMBR SPI PNXR: CPT | Performed by: STUDENT IN AN ORGANIZED HEALTH CARE EDUCATION/TRAINING PROGRAM

## 2023-01-05 PROCEDURE — 82784 ASSAY IGA/IGD/IGG/IGM EACH: CPT

## 2023-01-05 PROCEDURE — 87070 CULTURE OTHR SPECIMN AEROBIC: CPT

## 2023-01-05 PROCEDURE — 700102 HCHG RX REV CODE 250 W/ 637 OVERRIDE(OP): Performed by: STUDENT IN AN ORGANIZED HEALTH CARE EDUCATION/TRAINING PROGRAM

## 2023-01-05 RX ORDER — LEVOTHYROXINE SODIUM 175 UG/1
175 TABLET ORAL
Status: DISCONTINUED | OUTPATIENT
Start: 2023-01-05 | End: 2023-01-07 | Stop reason: HOSPADM

## 2023-01-05 RX ORDER — LEVOTHYROXINE SODIUM 0.1 MG/1
100 TABLET ORAL
Status: DISCONTINUED | OUTPATIENT
Start: 2023-01-05 | End: 2023-01-05

## 2023-01-05 RX ORDER — IBUPROFEN 200 MG
200 TABLET ORAL EVERY 6 HOURS PRN
Status: DISCONTINUED | OUTPATIENT
Start: 2023-01-05 | End: 2023-01-07 | Stop reason: HOSPADM

## 2023-01-05 RX ADMIN — LIDOCAINE HYDROCHLORIDE 20 ML: 10 INJECTION, SOLUTION INFILTRATION; PERINEURAL at 16:35

## 2023-01-05 RX ADMIN — LEVOTHYROXINE SODIUM 175 MCG: 0.17 TABLET ORAL at 10:04

## 2023-01-05 RX ADMIN — SODIUM CHLORIDE 1000 MG: 9 INJECTION, SOLUTION INTRAVENOUS at 05:21

## 2023-01-05 ASSESSMENT — COGNITIVE AND FUNCTIONAL STATUS - GENERAL
SUGGESTED CMS G CODE MODIFIER MOBILITY: CH
MOBILITY SCORE: 24
DAILY ACTIVITIY SCORE: 24
SUGGESTED CMS G CODE MODIFIER DAILY ACTIVITY: CH

## 2023-01-05 ASSESSMENT — PAIN DESCRIPTION - PAIN TYPE
TYPE: CHRONIC PAIN
TYPE: CHRONIC PAIN

## 2023-01-05 NOTE — ED NOTES
Med Rec Complete per patient  Allergies Reviewed with patient  No antibiotics within the last 30 days  Patient's Preferred Pharmacy:  [facility] on [address]

## 2023-01-05 NOTE — CARE PLAN
The patient is Stable - Low risk of patient condition declining or worsening     Patient states being able to see outlines.    Progress made toward(s) clinical / shift goals:    Problem: Pain - Standard  Goal: Alleviation of pain or a reduction in pain to the patient’s comfort goal  Outcome: Progressing     Problem: Fall Risk  Goal: Patient will remain free from falls  Outcome: Progressing

## 2023-01-05 NOTE — PROGRESS NOTES
Pt refusing skin check during my assessment. Pt allows this RN to check feet only. Skin on feet clean, dry, and intact.

## 2023-01-05 NOTE — CARE PLAN
The patient is Stable - Low risk of patient condition declining or worsening    Shift Goals  Clinical Goals: Safety/mobility    Progress made toward(s) clinical / shift goals:    Problem: Pain - Standard  Goal: Alleviation of pain or a reduction in pain to the patient’s comfort goal  Outcome: Progressing   0-10 pain scale in place. Pt encouraged to call if pain medication is needed.     Problem: Fall Risk  Goal: Patient will remain free from falls  Outcome: Progressing   Fall precautions in place. Call light. Bedside table, and pt belongings within reach. Offered toileting during rounding.     Patient is not progressing towards the following goals: N/A

## 2023-01-05 NOTE — ED NOTES
"Visual acuity exam performed by this tech, results as follows:  Both eyes: 20/40  Left eye: 20/40  Right eye: pt states \"its just black\"     Primary RN aware  "

## 2023-01-05 NOTE — ED PROVIDER NOTES
"  ER Provider Note    Scribed for Raza Gresham M.d. by Cuauhtemoc Hay. 1/4/2023  5:04 PM    Primary Care Provider: Pcp Pt States None    CHIEF COMPLAINT  Chief Complaint   Patient presents with    Sent by MD     Optbladimir    Loss of Vision     To right eye on November 5, 2022     HPI/ROS  LIMITATION TO HISTORY   Select: : None    Roxy Sandhu is a 67 y.o. female who presents to the ED complaining of right eye vision loss onset 2 months ago. She was seen by Dr. Pantoja where she had a MRI and a CT, and he is worried that there may be some nerve damage to the eye and recommended that she be admitted to the hospital and be placed on high dose steroids. She notes no other symptoms besides her vision loss on the right eye. She denies any difficulty with walking. She notes when she held a flashlight up to her eye she was able to see some outlines of objects in the room.    PAST MEDICAL HISTORY  Past Medical History:   Diagnosis Date    Breast cancer (HCC)     Breath shortness     Cancer (HCC) 2011    breast       SURGICAL HISTORY  Past Surgical History:   Procedure Laterality Date    LUMPECTOMY  09/02/2011    [facility]    NODE BIOPSY SENTINEL  09/02/2011    [facility]    EYE SURGERY         FAMILY HISTORY  Family History   Problem Relation Age of Onset    Diabetes Mother        SOCIAL HISTORY   reports that she has never smoked. She has never used smokeless tobacco. She reports that she does not currently use alcohol. She reports that she does not currently use drugs after having used the following drugs: Marijuana.    CURRENT MEDICATIONS  Previous Medications    No medications on file       ALLERGIES  No known drug allergy    PHYSICAL EXAM  BP (!) 143/101   Pulse 82   Temp 37.1 °C (98.8 °F) (Temporal)   Resp 16   Ht 1.6 m (5' 3\")   Wt 70.2 kg (154 lb 12.2 oz)   SpO2 94%   BMI 27.42 kg/m²   Gen: Alert, " no acute distress  HEENT: ATNC, normal oropharynx  Eyes: PERRL, EOMI, normal conjunctiva. Afferent pupillary defect on the right. Able to count fingers at 3 feet in right eye  Neck: trachea midline, no meningismus  Resp: no respiratory distress  CV: No JVD, regular rate and rhythm  Abd: non-distended, soft, nontender  Ext: No deformities  Psych: normal mood  Neuro: speech fluent, cranial nerves 2-12 intact, GCS 15, normal cerebellar function, NIHSS: 0    DIAGNOSTIC STUDIES    Labs:   Results for orders placed or performed during the hospital encounter of 01/04/23   CBC WITH DIFFERENTIAL   Result Value Ref Range    WBC 5.2 4.8 - 10.8 K/uL    RBC 3.80 (L) 4.20 - 5.40 M/uL    Hemoglobin 12.3 12.0 - 16.0 g/dL    Hematocrit 37.3 37.0 - 47.0 %    MCV 98.2 (H) 81.4 - 97.8 fL    MCH 32.4 27.0 - 33.0 pg    MCHC 33.0 (L) 33.6 - 35.0 g/dL    RDW 51.7 (H) 35.9 - 50.0 fL    Platelet Count 205 164 - 446 K/uL    MPV 9.6 9.0 - 12.9 fL    Neutrophils-Polys 66.30 44.00 - 72.00 %    Lymphocytes 25.40 22.00 - 41.00 %    Monocytes 6.30 0.00 - 13.40 %    Eosinophils 1.00 0.00 - 6.90 %    Basophils 0.60 0.00 - 1.80 %    Immature Granulocytes 0.40 0.00 - 0.90 %    Nucleated RBC 0.00 /100 WBC    Neutrophils (Absolute) 3.48 2.00 - 7.15 K/uL    Lymphs (Absolute) 1.33 1.00 - 4.80 K/uL    Monos (Absolute) 0.33 0.00 - 0.85 K/uL    Eos (Absolute) 0.05 0.00 - 0.51 K/uL    Baso (Absolute) 0.03 0.00 - 0.12 K/uL    Immature Granulocytes (abs) 0.02 0.00 - 0.11 K/uL    NRBC (Absolute) 0.00 K/uL   COMP METABOLIC PANEL   Result Value Ref Range    Sodium 137 135 - 145 mmol/L    Potassium 4.2 3.6 - 5.5 mmol/L    Chloride 103 96 - 112 mmol/L    Co2 22 20 - 33 mmol/L    Anion Gap 12.0 7.0 - 16.0    Glucose 100 (H) 65 - 99 mg/dL    Bun 15 8 - 22 mg/dL    Creatinine 0.96 0.50 - 1.40 mg/dL    Calcium 9.4 8.5 - 10.5 mg/dL    AST(SGOT) 24 12 - 45 U/L    ALT(SGPT) 13 2 - 50 U/L    Alkaline Phosphatase 54 30 - 99 U/L    Total Bilirubin 0.5 0.1 - 1.5 mg/dL     Albumin 4.3 3.2 - 4.9 g/dL    Total Protein 8.0 6.0 - 8.2 g/dL    Globulin 3.7 (H) 1.9 - 3.5 g/dL    A-G Ratio 1.2 g/dL   PT/INR   Result Value Ref Range    PT 13.5 12.0 - 14.6 sec    INR 1.04 0.87 - 1.13   PTT   Result Value Ref Range    APTT 33.2 24.7 - 36.0 sec   CORRECTED CALCIUM   Result Value Ref Range    Correct Calcium 9.2 8.5 - 10.5 mg/dL   ESTIMATED GFR   Result Value Ref Range    GFR (CKD-EPI) 66 >60 mL/min/1.73 m 2     Radiology:   The attending emergency physician has independently interpreted the diagnostic imaging associated with this visit and am waiting the final reading from the radiologist.     MR-CERVICAL SPINE-WITH & W/O    (Results Pending)   MR-THORACIC SPINE-WITH & W/O    (Results Pending)   MR-LUMBAR SPINE-WITH & W/O    (Results Pending)     COURSE & MEDICAL DECISION MAKING     ED Observation Status? No; Patient does not meet criteria for ED Observation.     INITIAL ASSESSMENT AND PLAN  Care Narrative: Patient presents with loss of vision from the right eye.  The outpatient note by Dr. Pantoja was reviewed, recommends MRI of the spine, broad lab work-up, high-dose steroids for concern for optic neuritis.  This will be initiated in the emergency department, patient will be hospitalized for the required high-dose steroids to help prevent loss of vision    Results of outside CTA-head:   FINDINGS:  The posterior circulation shows the distal vertebral arteries to be patent. The vertebrobasilar confluence is intact. The basilar artery is patent. No aneurysm or occlusive lesion is evident.     The anterior circulation shows no stenotic or occlusive lesion. No aneurysm is evident about the [place].     The brain is unremarkable.     3D angiographic/MIP images of the vasculature confirm the vascular findings as described above.     IMPRESSION:     CT angiogram of the [place] within normal limits.    Results of outside MRI:   FINDINGS: There is abnormal enhancement of the  retrobulbar portion of the right optic nerve. The left optic nerve is unremarkable. 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  There is no measurable proptosis. The extraocular muscles are normal in caliber and symmetry. There are no intraconal or extraconal mass lesions or abnormal enhancement. The retroocular fat is clear. There are no suprasellar or parasellar mass lesions.   The cavernous sinuses show normal configuration and enhancement. The superior ophthalmic veins show normal caliber.     Mild cerebral volume loss is seen. A few nonspecific T2 hyperintensities in the subcortical and periventricular white matter     Vascular flow voids in the vertebrobasilar and carotid arteries, [place], and dural venous sinuses are intact.     IMPRESSION:     1.  Abnormal enhancement of the retro-orbital portion of the right optic nerve consistent with acute optic neuritis.  2.  Mild cerebral volume loss.  3.  Mild chronic microvascular ischemic disease.    5:27 PM - Patient seen and examined at bedside. Patient will be medicated with methylprednisolone 1000 mg in 100 mL. Ordered MR-T-spine w/ and w/o, MR-L-spine w/ and w/o, MR-C-spine w/ and w/o, connective tissue diseases profile. PT/INR, PTT, anticardiolipin AB IGG/IGM/IGA, lupus anticoagulant, CBC w/ diff, CMP, and TSH w/ reflex.      DISPOSITION:  After face-to-face discussion, patient will be hospitalized by Dr. Valenzuela in guarded condition.     FINAL DIAGNOSIS  1. Optic neuritis, right    2. Vision loss of right eye      Cuauhtemoc MOREIRA (Daniel), am scribing for, and in the presence of, Raza Gresham M.D..    Electronically signed by: Cuauhtemoc Hay (Daniel), 1/4/2023    Raza MOREIRA M.D. personally performed the services described in this documentation, as scribed by Cuauhtemoc Hay in my presence, and it is both accurate and complete.    The note accurately reflects work and decisions made by me.  Raza Gresham M.D.  1/4/2023  8:37 PM

## 2023-01-05 NOTE — H&P
Hospital Medicine History & Physical Note    Date of Service  1/4/2023    Primary Care Physician  Pcp Pt States None    Consultants  none      Code Status  Full Code    Chief Complaint  Chief Complaint   Patient presents with    Sent by MD     Optbladimir    Loss of Vision     To right eye on November 5, 2022       History of Presenting Illness  Roxy Sandhu is a 67 y.o. female who presented 1/4/2023 with right eye blindness.  Ms. Sandhu has a past medical history of breast cancer resected 2011 otherwise no known medical conditions that developed right eye vision loss in early November was diagnosed with nonarteritis ischemic optic neuropathy.  She was referred to Dr. Pantoja neuro-ophthalmology (please refer to his note from 1/3/2023 which is very thorough).  He referred her to outpatient CTA of the head neck which were negative.  He then referred her to the emergency room for admission and 3 days of 1 g of IV Solu-Medrol for optic neuritis.  He is recommended an autoimmune work-up as well as an MRI of the cervical, thoracic, and lumbar spine to evaluate for enhancing lesions presumably demyelinating disease.  Is also recommended a lumbar puncture for cytology given her history of breast cancer.  When I saw her she was being taken to MRI we did discuss a lumbar puncture which she is amenable to though may not be able to be get done tonight but could be done in the morning if not.    I discussed the plan of care with Dr. Gresham.    Review of Systems  Review of Systems   Constitutional:  Negative for chills and fever.   Respiratory:  Negative for cough.    Cardiovascular:  Negative for chest pain.   Neurological:  Negative for sensory change, speech change, focal weakness and weakness.        Chronic neuropathy of her feet   All other systems reviewed and are negative.    Past Medical History   has a past medical history of Breast cancer (HCC), Breath shortness, and Cancer (HCC) (2011).    Surgical History    has a past surgical history that includes lumpectomy (09/02/2011); node biopsy sentinel (09/02/2011); and eye surgery.     Family History  No known family history of autoimmune disease to her knowledge    Social History   reports that she has never smoked. She has never used smokeless tobacco. She reports that she does not currently use alcohol. She reports that she does not currently use drugs after having used the following drugs: Marijuana.    Allergies  No Known Allergies    Medications  None       Physical Exam  Temp:  [37.1 °C (98.8 °F)] 37.1 °C (98.8 °F)  Pulse:  [82] 82  Resp:  [16] 16  BP: (143)/(101) 143/101  SpO2:  [94 %] 94 %  Blood Pressure : (!) 143/101   Temperature: 37.1 °C (98.8 °F)   Pulse: 82   Respiration: 16   Pulse Oximetry: 94 %       Physical Exam  Vitals and nursing note reviewed.   Constitutional:       General: She is not in acute distress.     Appearance: Normal appearance.   HENT:      Head: Normocephalic and atraumatic.      Mouth/Throat:      Mouth: Mucous membranes are dry.      Pharynx: Oropharynx is clear.   Eyes:      General: No scleral icterus.     Conjunctiva/sclera: Conjunctivae normal.   Cardiovascular:      Rate and Rhythm: Normal rate and regular rhythm.   Pulmonary:      Effort: Pulmonary effort is normal.      Breath sounds: Normal breath sounds.   Abdominal:      General: There is no distension.      Tenderness: There is no abdominal tenderness.   Musculoskeletal:      Cervical back: Normal range of motion and neck supple.      Right lower leg: No edema.      Left lower leg: No edema.   Skin:     General: Skin is warm and dry.   Neurological:      General: No focal deficit present.      Mental Status: She is alert and oriented to person, place, and time.   Psychiatric:         Mood and Affect: Mood normal.         Behavior: Behavior normal.         Thought Content: Thought content normal.         Judgment: Judgment normal.       Laboratory:  Recent Labs     01/04/23  1731    WBC 5.2   RBC 3.80*   HEMOGLOBIN 12.3   HEMATOCRIT 37.3   MCV 98.2*   MCH 32.4   MCHC 33.0*   RDW 51.7*   PLATELETCT 205   MPV 9.6     Recent Labs     01/04/23  1731   SODIUM 137   POTASSIUM 4.2   CHLORIDE 103   CO2 22   GLUCOSE 100*   BUN 15   CREATININE 0.96   CALCIUM 9.4     Recent Labs     01/04/23  1731   ALTSGPT 13   ASTSGOT 24   ALKPHOSPHAT 54   TBILIRUBIN 0.5   GLUCOSE 100*     Recent Labs     01/04/23  1731   APTT 33.2   INR 1.04     No results for input(s): NTPROBNP in the last 72 hours.      No results for input(s): TROPONINT in the last 72 hours.    Imaging:  MR-CERVICAL SPINE-WITH & W/O    (Results Pending)   MR-THORACIC SPINE-WITH & W/O    (Results Pending)   MR-LUMBAR SPINE-WITH & W/O    (Results Pending)           Assessment/Plan:  Justification for Admission Status  I anticipate this patient will require at least two midnights for appropriate medical management, necessitating inpatient admission because 3 days of IV solumedrol    Patient will need a Med/Surg bed on MEDICAL service .  The need is secondary to as above.    * Optic neuritis- (present on admission)  Assessment & Plan  Sent from Dr. Pantoja's office for inpatient 3 days of solumedrol 1 gram daily (see his very thorough note in Epic). She may become hyperglycemic thus start sliding scale.  MRI tonight  Lumbar puncture after MRI to look for demyelinating process or tomorrow (refrain from anticoag)  Autoimmune work up sent from the ER  She may require neurology consult        History of breast cancer- (present on admission)  Assessment & Plan  ER and TX +   Removed 2011        VTE prophylaxis: SCDs/TEDs

## 2023-01-05 NOTE — ASSESSMENT & PLAN NOTE
Sent from Dr. Pantoja's office for inpatient 3 days of solumedrol 1 gram daily (see his very thorough note in Epic). She may become hyperglycemic thus start sliding scale.  MRI tonight  Lumbar puncture after MRI to look for demyelinating process or tomorrow (refrain from anticoag)  Autoimmune work up sent from the ER  She may require neurology consult

## 2023-01-06 ENCOUNTER — APPOINTMENT (OUTPATIENT)
Dept: RADIOLOGY | Facility: MEDICAL CENTER | Age: 68
DRG: 060 | End: 2023-01-06
Payer: MEDICARE

## 2023-01-06 LAB
GLUCOSE BLD STRIP.AUTO-MCNC: 126 MG/DL (ref 65–99)
GLUCOSE BLD STRIP.AUTO-MCNC: 154 MG/DL (ref 65–99)
T4 FREE SERPL-MCNC: <0.11 NG/DL (ref 0.93–1.7)
THYROPEROXIDASE AB SERPL-ACNC: >600 IU/ML (ref 0–9)

## 2023-01-06 PROCEDURE — 700102 HCHG RX REV CODE 250 W/ 637 OVERRIDE(OP)

## 2023-01-06 PROCEDURE — 700111 HCHG RX REV CODE 636 W/ 250 OVERRIDE (IP): Performed by: HOSPITALIST

## 2023-01-06 PROCEDURE — 76536 US EXAM OF HEAD AND NECK: CPT

## 2023-01-06 PROCEDURE — 770001 HCHG ROOM/CARE - MED/SURG/GYN PRIV*

## 2023-01-06 PROCEDURE — A9270 NON-COVERED ITEM OR SERVICE: HCPCS

## 2023-01-06 PROCEDURE — 84442 ASSAY OF THYROID ACTIVITY: CPT

## 2023-01-06 PROCEDURE — 700102 HCHG RX REV CODE 250 W/ 637 OVERRIDE(OP): Performed by: STUDENT IN AN ORGANIZED HEALTH CARE EDUCATION/TRAINING PROGRAM

## 2023-01-06 PROCEDURE — 36415 COLL VENOUS BLD VENIPUNCTURE: CPT

## 2023-01-06 PROCEDURE — 99222 1ST HOSP IP/OBS MODERATE 55: CPT | Mod: GC | Performed by: PSYCHIATRY & NEUROLOGY

## 2023-01-06 PROCEDURE — A9270 NON-COVERED ITEM OR SERVICE: HCPCS | Performed by: STUDENT IN AN ORGANIZED HEALTH CARE EDUCATION/TRAINING PROGRAM

## 2023-01-06 PROCEDURE — 86376 MICROSOMAL ANTIBODY EACH: CPT

## 2023-01-06 PROCEDURE — 82962 GLUCOSE BLOOD TEST: CPT | Mod: 91

## 2023-01-06 PROCEDURE — 700105 HCHG RX REV CODE 258: Performed by: HOSPITALIST

## 2023-01-06 PROCEDURE — 99232 SBSQ HOSP IP/OBS MODERATE 35: CPT | Mod: GC | Performed by: STUDENT IN AN ORGANIZED HEALTH CARE EDUCATION/TRAINING PROGRAM

## 2023-01-06 RX ORDER — MIRTAZAPINE 15 MG/1
7.5 TABLET, FILM COATED ORAL
Status: DISCONTINUED | OUTPATIENT
Start: 2023-01-06 | End: 2023-01-07 | Stop reason: HOSPADM

## 2023-01-06 RX ADMIN — MIRTAZAPINE 7.5 MG: 15 TABLET, FILM COATED ORAL at 21:17

## 2023-01-06 RX ADMIN — LEVOTHYROXINE SODIUM 175 MCG: 0.17 TABLET ORAL at 05:33

## 2023-01-06 RX ADMIN — SODIUM CHLORIDE 1000 MG: 9 INJECTION, SOLUTION INTRAVENOUS at 05:34

## 2023-01-06 ASSESSMENT — PAIN DESCRIPTION - PAIN TYPE
TYPE: CHRONIC PAIN
TYPE: CHRONIC PAIN

## 2023-01-06 ASSESSMENT — ENCOUNTER SYMPTOMS
EYE REDNESS: 0
DOUBLE VISION: 0
BLURRED VISION: 1
POLYDIPSIA: 0
EYE PAIN: 0
CONSTIPATION: 0
SPEECH CHANGE: 0
DIZZINESS: 0
HEADACHES: 1
INSOMNIA: 0
COUGH: 0
PALPITATIONS: 0
FEVER: 0
BACK PAIN: 0
VOMITING: 0
MYALGIAS: 0
CHILLS: 0
TINGLING: 1
ABDOMINAL PAIN: 0
LOSS OF CONSCIOUSNESS: 0
SHORTNESS OF BREATH: 0
STRIDOR: 0
EYE DISCHARGE: 0
WEAKNESS: 0
NAUSEA: 0
PHOTOPHOBIA: 0
DIARRHEA: 0
BRUISES/BLEEDS EASILY: 0

## 2023-01-06 ASSESSMENT — LIFESTYLE VARIABLES: SUBSTANCE_ABUSE: 0

## 2023-01-06 NOTE — PROCEDURES
Procedure Lumbar Puncture    Date/Time: 1/5/2023 4:53 PM  Performed by: Miguel Mendoza D.O.  Authorized by: Tobias Velázquez M.D.     Consent:     Consent obtained:  Verbal and written    Consent given by:  Patient    Risks discussed:  Infection, headache, nerve damage, pain and repeat procedure    Alternatives discussed:  No treatment, delayed treatment and observation  Universal protocol:     Procedure explained and questions answered to patient or proxy's satisfaction: yes      Imaging studies available: yes      Immediately prior to procedure a time out was called: yes      Site/side marked: yes      Patient identity confirmed:  Verbally with patient, arm band and provided demographic data  Pre-procedure details:     Procedure purpose:  Diagnostic    Preparation: Patient was prepped and draped in usual sterile fashion    Sedation:     Sedation type:  None  Anesthesia:     Anesthesia method:  Local infiltration  Procedure details:     Lumbar space:  L4-L5 interspace    Patient position:  Sitting    Needle gauge:  20    Needle type:  Spinal needle - Quincke tip    Needle length (in):  3.5    Ultrasound guidance: no      Number of attempts:  1    Fluid appearance:  Clear    Tubes of fluid:  4    Total volume (ml):  20  Post-procedure:     Puncture site:  Adhesive bandage applied and direct pressure applied    Patient tolerance of procedure:  Tolerated well, no immediate complications  Comments:      Proctored by Dr. Tobias Velázquez.      Nauseated after procedure.
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
## 2023-01-06 NOTE — ASSESSMENT & PLAN NOTE
History of recurrent pounding  frontal headaches with no aura, no photophobia.'s are however associated with vomiting.   Likely history of migraine.  No brain lesions on MRI concerning for MS  PRN Tylenol and ibuprofen.   Avoid fioricet in the setting of optic neuritis.  Will hold off on triptans as well as these can cause ischemic neuropathy  CSF clonal antibodies pending to evaluate for multiple sources.

## 2023-01-06 NOTE — ASSESSMENT & PLAN NOTE
History of stage II ER/NE positive, HER2/ava nu negative breast cancer status post resection 2011 and TC based chemotherapy followed by adjuvant radiation therapy  Currently in remission
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
## 2023-01-06 NOTE — DISCHARGE PLANNING
LMSW provided patient list of mental health counselors and counseling centers in the [facility], per patient's request.

## 2023-01-06 NOTE — ASSESSMENT & PLAN NOTE
She reports chronic neuropathy in bilateral lower extremities for several years.  She reports intermittent alcohol use however denies any known history of alcoholism, she has no known history of diabetes.  She did not undergo TC based chemotherapy for breast cancer which may have caused her symptoms.  Consider gabapentin if this bothers her further.

## 2023-01-06 NOTE — CONSULTS
Neurology Consult Note      Resident: Aden Wills M.D.  Consulting MD: Miguel Fenton MD  Requesting Physician: Tobias Velázquez M.D.  Patient name:      Roxy Sandhu  :         1955  MRN:         [id number]     Date of Service: 2023    Chief Complaint:   Chief Complaint   Patient presents with    Sent by MD     Opthamology    Loss of Vision     To right eye on 2022     Referral Reason:  Right optic neuritis    HPI:    Roxy Sandhu is a 67 y.o. female with past medical history of left eye cataract surgery, left macular degeneration, stage II ER/DC+, HER2/ava nu negative breast cancer s/p chemo and resection, migraines who was sent to the ED by her ophthalmologist for right optic neuritis seen on outside MRI.  Per chart review of Dr. John Pantoja's, Ophthalmology notes 2022 and 1/3/2023, the patient has had no vision in her right eye since 2022 and had been referred there by [facility] for non-arteritic anterior ischemic optic neuropathy.  Since the patient's previous MRI was done without contrast, a contrasted study was repeated showing enhancement of the right optic nerve and she was advised to go to the ED for treatment with high-dose steroid.  Additionally, Dr. Bain had ordered a broad lab work-up of which notably her FELIX was strongly positive (titer >1:2560) and speckled pattern and her aquaporin 4 receptor antibody antibody was positive.  On exam, the patient states for right eye vision loss has improved.  When it began, she was only able to see black however now she can see clear and large objects.  She says the vision loss reached its ninoska in a rapid manner when it began.  She denies ever having pain with eye movements.  She denies any prior neurologic deficits.  She denies any family history of MS or neurologic disorders.  She denies any current use of tobacco, alcohol, or illicit drugs.  She is a former marijuana smoker.      ROS:   Review of  Systems   Constitutional:  Negative for chills and fever.   HENT:  Negative for congestion and hearing loss.    Eyes:  Positive for blurred vision. Negative for double vision, photophobia, pain, discharge and redness.   Respiratory:  Negative for cough, shortness of breath and stridor.    Cardiovascular:  Negative for chest pain and palpitations.   Gastrointestinal:  Negative for abdominal pain, constipation, diarrhea, nausea and vomiting.   Genitourinary:  Negative for dysuria and urgency.   Musculoskeletal:  Negative for back pain and myalgias.   Skin:  Negative for itching and rash.   Neurological:  Positive for tingling (chronic, b/l feet) and headaches (chronic, left sided). Negative for dizziness, speech change, loss of consciousness and weakness.   Endo/Heme/Allergies:  Negative for polydipsia. Does not bruise/bleed easily.   Psychiatric/Behavioral:  Negative for substance abuse. The patient does not have insomnia.      Past Medical History:   Past Medical History:   Diagnosis Date    Breast cancer (HCC)     Breath shortness     Cancer (HCC) 2011    breast       Past Surgical History:   Past Surgical History:   Procedure Laterality Date    LUMPECTOMY  09/02/2011    Performed by DOREEN GARCIA at SURGERY SAME DAY [facility]    NODE BIOPSY SENTINEL  09/02/2011    Performed by DOREEN GARCIA at SURGERY SAME DAY [facility]    EYE SURGERY         Social History:   Social History     Socioeconomic History    Marital status: Single     Spouse name: Not on file    Number of children: Not on file    Years of education: Not on file    Highest education level: Not on file   Occupational History    Not on file   Tobacco Use    Smoking status: Never    Smokeless tobacco: Never   Vaping Use    Vaping Use: Never used   Substance and Sexual Activity    Alcohol use: Not Currently     Comment: Occasional     Drug use: Not Currently     Types: Marijuana    Sexual activity: Not on file   Other Topics Concern    Not on file    Social History Narrative    disabled     Social Determinants of Health     Financial Resource Strain: Not on file   Food Insecurity: Not on file   Transportation Needs: Not on file   Physical Activity: Not on file   Stress: Not on file   Social Connections: Not on file   Intimate Partner Violence: Not on file   Housing Stability: Not on file       Family Hx:   Family History   Problem Relation Age of Onset    Diabetes Mother        Current Medications:   Current Facility-Administered Medications   Medication Dose Route Frequency Provider Last Rate Last Admin    levothyroxine (SYNTHROID) tablet 175 mcg  175 mcg Oral AM ES Mandy Cook M.D.   175 mcg at 01/06/23 0533    ibuprofen (MOTRIN) tablet 200 mg  200 mg Oral Q6HRS PRN Mandy Cook M.D.        senna-docusate (PERICOLACE or SENOKOT S) 8.6-50 MG per tablet 2 Tablet  2 Tablet Oral BID Chase Valenzuela M.D.        And    polyethylene glycol/lytes (MIRALAX) PACKET 1 Packet  1 Packet Oral QDAY PRN Chase Valenzuela M.D.        And    magnesium hydroxide (MILK OF MAGNESIA) suspension 30 mL  30 mL Oral QDAY PRN Chase Valenzuela M.D.        And    bisacodyl (DULCOLAX) suppository 10 mg  10 mg Rectal QDAY PRN Chase Valenzuela M.D.        acetaminophen (Tylenol) tablet 650 mg  650 mg Oral Q6HRS PRN Chase Valenzuela M.D.        ondansetron (ZOFRAN) syringe/vial injection 4 mg  4 mg Intravenous Q4HRS PRN Chase Valenzuela M.D.        ondansetron (ZOFRAN ODT) dispertab 4 mg  4 mg Oral Q4HRS PRN Chase Valenzuela M.D.        insulin regular (HumuLIN R,NovoLIN R) injection  2-9 Units Subcutaneous 4X/DAY ACHS Chase Valenzuela M.D.        And    dextrose 10 % BOLUS 25 g  25 g Intravenous Q15 MIN PRN Chase Valenzuela M.D.           Allergies:   Allergies   Allergen Reactions    Bloodless          Physical Exam:   Vitals:    01/05/23 1729 01/05/23 1855 01/06/23 0400 01/06/23 0719   BP: 138/85 139/84 137/84 136/86   Pulse: 76 80 80 68   Resp: 18 18 18 17   Temp: 36.4 °C (97.5 °F) 36.7 °C  (98.1 °F) 37.2 °C (99 °F) 36.8 °C (98.3 °F)   TempSrc: Temporal Temporal Temporal Temporal   SpO2: 93% 93% 94% 91%   Weight:       Height:           Physical Exam  CONSTITUTIONAL:  Lying in the hospital bed in no apparent distress, appears well nourished  EYES:  conjunctiva are clear without icterus  HEENT: Atraumatic and normocephalic, hearing is symmetric, dentition intact  NECK:  Supple and non tender, no masses  CV:  Carotid pulses are present bilaterally and normal amplitude, extremities are well perfused without edema  MUSCULOSKELETAL:  see neurological exam for details of gait and station, muscle strength and range of motion in the extremities  SKIN:  no rashes, induration or excessive bruising  PSYCH:  appears to have good judgement and insight, see neurological exam for mental status    NEUROLOGICAL EXAM  MENTAL STATUS:  Awake, alert, oriented times 3.  Speech is fluent, comprehension is intact.  CRANIAL NERVES:  afferent pupillary defect of R eye, R eye only able to see color and shapes, L eye vision 20/30, EOMI with no nystagmus, face is symmetric other than possible right ptosis, facial sensation is intact, tongue is in the midline, palate is symmetric.  MOTOR:  5/5 throughout  REFLEXES:  2+ and symmetric, toes are downgoing bilaterally  SENSATION:  Intact to light touch and proprioception throughout  COORDINATION:  Normal finger to nose and heel to shin bilaterally  GAIT:  Deferred, due to weakness and fall risk      Labs:  Recent Labs     01/04/23  1731   WBC 5.2   RBC 3.80*   HEMOGLOBIN 12.3   HEMATOCRIT 37.3   MCV 98.2*   MCH 32.4   MCHC 33.0*   RDW 51.7*   PLATELETCT 205   MPV 9.6     Recent Labs     01/04/23  1731   SODIUM 137   POTASSIUM 4.2   CHLORIDE 103   CO2 22   GLUCOSE 100*   BUN 15   CREATININE 0.96   CALCIUM 9.4     Recent Labs     01/04/23  1731   APTT 35.3  33.2   INR 1.07  1.04                 Recent Labs     01/04/23  1731   SODIUM 137   POTASSIUM 4.2   CHLORIDE 103   CO2 22    GLUCOSE 100*   BUN 15     Recent Labs     01/04/23  1731   SODIUM 137   POTASSIUM 4.2   CHLORIDE 103   CO2 22   BUN 15   CREATININE 0.96   CALCIUM 9.4     Recent Labs     01/04/23  1731   APTT 35.3  33.2   INR 1.07  1.04     No results found for this or any previous visit.      Imaging reviewed:    MR-LUMBAR SPINE-WITH & W/O   Final Result      1.  Small annular fissure in the annulus fibrosis posteriorly at the L5-S1 level otherwise unremarkable MRI scan of the lumbar spine for age.      2.  No evidence of demyelinating process in the lower thoracic cord or conus medullaris.      MR-THORACIC SPINE-WITH & W/O   Final Result         1. Mild discal degenerative changes with minimal anterior osteophytosis of the mid and lower thoracic spine.         2. No evidence of demyelinating process in the thoracic cord.      MR-CERVICAL SPINE-WITH & W/O   Final Result      1.  Mild discal degenerative changes throughout cervical region with mild marginal osteophytosis.      2.  Minimal to mild multilevel cervical spondylotic change.      3.  No evidence of demyelinating process in the cervical cord.             Assessment/Plan:  Roxy is a 67 y.o. female with past medical history of left eye cataract surgery, left macular degeneration, stage II ER/GA+, HER2/ava nu negative breast cancer s/p chemo and resection, and migraines here with right optic neuritis demonstrated on MRI.  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  Her subclinical hypothyroidism with markedly elevated TSH to 123 may represent autoimmune thyroiditis which would also suggestive of autoimmune.  She is status post LP on 1/5/2023 which preliminary results show slight albumin no cytologic dissociation but negative for any obvious signs of infection.  She does appear to be responding symptomatically to steroids.    - CSF labs (myelin basic protein,  oligoclonal bands) pending  - Finish 3 days of high dose steroids after which she is okay to discharge from a neurologic perspective.  She will need to follow up with her neuro-ophthalmologist in 1-2 weeks to reassess her vision.  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      Greater than 50% of this 51 minute face to face encounter was devoted to disease state counseling on stroke and coordination of care. (see above assessment and plan for further details of discussion).

## 2023-01-06 NOTE — HOSPITAL COURSE
Roxy Sandhu is a 67 y.o. 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  MRI of C-spine and T-spine L-spine negative for demyelinating lesions.  Labs work-up notable for elevated aquaporin antibody at 28 and FELIX elevated at  1:2560, negative lupus anticoagulant, , free T4, initially undetectable less than 0.11, free T3 0.7, microsomal TPO above 600.  She was initiated on levothyroxine 175 mcg increase to 200 mcg daily and liothyronine 5 mcg daily and referred to outpatient endocrinology.  She was seen by neurology for optic neuritis, It was recommended that she continue high-dose steroids for 3 days and follow-up outpatient with both neurology and neuro-ophthalmology.  Her vision had improved compared to when she came in however had not resolved completely.  Upon discharge she was able to see shapes and movement from her right eye.  Repeat labs notable for free T3 of 0.96, free T4 0.48, .  Labs on discharge notable for mild leukocytosis likely secondary to his steroid use.  Thyroid ultrasound revealed 1.6 cm right lower thyroid nodule, 1.4 cm left upper thyroid nodule.  She is to follow-up with endocrinology outpatient for outpatient FNA and follow-up of hypothyroidism

## 2023-01-06 NOTE — CARE PLAN
The patient is Stable - Low risk of patient condition declining or worsening    Shift Goals  Clinical Goals: Safety/mobility  Patient Goals: rest    Progress made toward(s) clinical / shift goals:    Problem: Pain - Standard  Goal: Alleviation of pain or a reduction in pain to the patient’s comfort goal  Outcome: Progressing   0-10 pain scale in place. Pt encouraged to notify this RN if pain medication is needed.     Problem: Fall Risk  Goal: Patient will remain free from falls  Outcome: Progressing   Fall precautions in place. Call light, bedside table, and pt belongings within reach. Offered toileting during rounding.     Patient is not progressing towards the following goals: N/A

## 2023-01-06 NOTE — CARE PLAN
The patient is Stable - Low risk of patient condition declining or worsening    Shift Goals  Clinical Goals: safety/mobility  Patient Goals: rest    Progress made toward(s) clinical / shift goals:    Problem: Fall Risk  Goal: Patient will remain free from falls  Outcome: Progressing     Problem: Pain - Standard  Goal: Alleviation of pain or a reduction in pain to the patient’s comfort goal  Outcome: Progressing       Patient is not progressing towards the following goals:

## 2023-01-06 NOTE — ASSESSMENT & PLAN NOTE
Per ophthalmology documentation, patient was referred to neuro-ophthalmology,  Dr. Pantoja for acute vision loss of the right eye on November 5.  MRI with contrast demonstrated enhancement of the right optic nerve  Labs : MOG negative however aquaporin AB was elevated at 28.  FELIX was also significantly elevated  1:2560, PT PTT wnl,   - MRI of the cervical, thoracic and lumbar spine with contrast  was negative for any other enhancing lesion,  - LP with cytology, oligoclonal bands and myelin basic protein pending   - Connective tissue disease profile, lupus anticoagulant, antiphospholipid antibody,-   - Neurology consulted   - Patient will be hospitalized for 3 days to receive 1 g Solu-Medrol daily for 3 days ( end on 1/6).  If there is no improvement, consider IVIG or plasmapheresis.   - Monitor for hyperglycemia  - thyroid ultrasound, TPO antibody, anti-thyroglobulin antibody

## 2023-01-07 VITALS
WEIGHT: 154.76 LBS | RESPIRATION RATE: 18 BRPM | SYSTOLIC BLOOD PRESSURE: 137 MMHG | HEART RATE: 71 BPM | OXYGEN SATURATION: 91 % | TEMPERATURE: 98.3 F | DIASTOLIC BLOOD PRESSURE: 85 MMHG | HEIGHT: 63 IN | BODY MASS INDEX: 27.42 KG/M2

## 2023-01-07 PROBLEM — E06.3 HYPOTHYROIDISM DUE TO HASHIMOTO'S THYROIDITIS: Status: ACTIVE | Noted: 2023-01-07

## 2023-01-07 PROBLEM — E04.1 THYROID NODULE: Status: ACTIVE | Noted: 2023-01-07

## 2023-01-07 PROBLEM — E03.8 HYPOTHYROIDISM DUE TO HASHIMOTO'S THYROIDITIS: Status: ACTIVE | Noted: 2023-01-07

## 2023-01-07 PROBLEM — R45.89 NON-SUICIDAL DEPRESSED MOOD: Status: ACTIVE | Noted: 2023-01-07

## 2023-01-07 PROBLEM — E03.8 OTHER SPECIFIED HYPOTHYROIDISM: Status: ACTIVE | Noted: 2023-01-07

## 2023-01-07 LAB
ALBUMIN SERPL BCP-MCNC: 4.2 G/DL (ref 3.2–4.9)
ALBUMIN/GLOB SERPL: 1.3 G/DL
ALP SERPL-CCNC: 51 U/L (ref 30–99)
ALT SERPL-CCNC: 8 U/L (ref 2–50)
ANION GAP SERPL CALC-SCNC: 15 MMOL/L (ref 7–16)
AST SERPL-CCNC: 18 U/L (ref 12–45)
BASOPHILS # BLD AUTO: 0.1 % (ref 0–1.8)
BASOPHILS # BLD: 0.01 K/UL (ref 0–0.12)
BILIRUB SERPL-MCNC: 1.2 MG/DL (ref 0.1–1.5)
BUN SERPL-MCNC: 24 MG/DL (ref 8–22)
CALCIUM ALBUM COR SERPL-MCNC: 8.7 MG/DL (ref 8.5–10.5)
CALCIUM SERPL-MCNC: 8.9 MG/DL (ref 8.5–10.5)
CARDIOLIPIN IGA SER IA-ACNC: <10 APL
CARDIOLIPIN IGG SER IA-ACNC: <10 GPL
CARDIOLIPIN IGM SER IA-ACNC: 22 MPL
CHLORIDE SERPL-SCNC: 104 MMOL/L (ref 96–112)
CO2 SERPL-SCNC: 22 MMOL/L (ref 20–33)
CREAT SERPL-MCNC: 0.9 MG/DL (ref 0.5–1.4)
EOSINOPHIL # BLD AUTO: 0 K/UL (ref 0–0.51)
EOSINOPHIL NFR BLD: 0 % (ref 0–6.9)
ERYTHROCYTE [DISTWIDTH] IN BLOOD BY AUTOMATED COUNT: 51.1 FL (ref 35.9–50)
GFR SERPLBLD CREATININE-BSD FMLA CKD-EPI: 71 ML/MIN/1.73 M 2
GLOBULIN SER CALC-MCNC: 3.2 G/DL (ref 1.9–3.5)
GLUCOSE BLD STRIP.AUTO-MCNC: 108 MG/DL (ref 65–99)
GLUCOSE BLD STRIP.AUTO-MCNC: 125 MG/DL (ref 65–99)
GLUCOSE BLD STRIP.AUTO-MCNC: 127 MG/DL (ref 65–99)
GLUCOSE BLD STRIP.AUTO-MCNC: 139 MG/DL (ref 65–99)
GLUCOSE BLD STRIP.AUTO-MCNC: 141 MG/DL (ref 65–99)
GLUCOSE SERPL-MCNC: 109 MG/DL (ref 65–99)
HCT VFR BLD AUTO: 34.9 % (ref 37–47)
HGB BLD-MCNC: 11.6 G/DL (ref 12–16)
IGG CSF-MCNC: 8.1 MG/DL (ref 0–6)
IMM GRANULOCYTES # BLD AUTO: 0.08 K/UL (ref 0–0.11)
IMM GRANULOCYTES NFR BLD AUTO: 0.7 % (ref 0–0.9)
LYMPHOCYTES # BLD AUTO: 0.52 K/UL (ref 1–4.8)
LYMPHOCYTES NFR BLD: 4.4 % (ref 22–41)
MCH RBC QN AUTO: 32.2 PG (ref 27–33)
MCHC RBC AUTO-ENTMCNC: 33.2 G/DL (ref 33.6–35)
MCV RBC AUTO: 96.9 FL (ref 81.4–97.8)
MONOCYTES # BLD AUTO: 0.54 K/UL (ref 0–0.85)
MONOCYTES NFR BLD AUTO: 4.6 % (ref 0–13.4)
NEUTROPHILS # BLD AUTO: 10.66 K/UL (ref 2–7.15)
NEUTROPHILS NFR BLD: 90.2 % (ref 44–72)
NRBC # BLD AUTO: 0 K/UL
NRBC BLD-RTO: 0 /100 WBC
PLATELET # BLD AUTO: 195 K/UL (ref 164–446)
PMV BLD AUTO: 9.9 FL (ref 9–12.9)
POTASSIUM SERPL-SCNC: 3.6 MMOL/L (ref 3.6–5.5)
PROT SERPL-MCNC: 7.4 G/DL (ref 6–8.2)
RBC # BLD AUTO: 3.6 M/UL (ref 4.2–5.4)
SODIUM SERPL-SCNC: 141 MMOL/L (ref 135–145)
T3FREE SERPL-MCNC: 0.96 PG/ML (ref 2–4.4)
T4 FREE SERPL-MCNC: 0.48 NG/DL (ref 0.93–1.7)
WBC # BLD AUTO: 11.8 K/UL (ref 4.8–10.8)

## 2023-01-07 PROCEDURE — 36415 COLL VENOUS BLD VENIPUNCTURE: CPT

## 2023-01-07 PROCEDURE — 82962 GLUCOSE BLOOD TEST: CPT

## 2023-01-07 PROCEDURE — 85025 COMPLETE CBC W/AUTO DIFF WBC: CPT

## 2023-01-07 PROCEDURE — 99239 HOSP IP/OBS DSCHRG MGMT >30: CPT | Mod: GC | Performed by: STUDENT IN AN ORGANIZED HEALTH CARE EDUCATION/TRAINING PROGRAM

## 2023-01-07 PROCEDURE — 80053 COMPREHEN METABOLIC PANEL: CPT

## 2023-01-07 PROCEDURE — 84439 ASSAY OF FREE THYROXINE: CPT

## 2023-01-07 PROCEDURE — 84481 FREE ASSAY (FT-3): CPT

## 2023-01-07 PROCEDURE — 700102 HCHG RX REV CODE 250 W/ 637 OVERRIDE(OP): Performed by: STUDENT IN AN ORGANIZED HEALTH CARE EDUCATION/TRAINING PROGRAM

## 2023-01-07 PROCEDURE — A9270 NON-COVERED ITEM OR SERVICE: HCPCS | Performed by: STUDENT IN AN ORGANIZED HEALTH CARE EDUCATION/TRAINING PROGRAM

## 2023-01-07 RX ORDER — LEVOTHYROXINE SODIUM 0.2 MG/1
200 TABLET ORAL
Qty: 30 TABLET | Refills: 0 | Status: SHIPPED | OUTPATIENT
Start: 2023-01-07 | End: 2023-02-06

## 2023-01-07 RX ORDER — LIOTHYRONINE SODIUM 5 UG/1
5 TABLET ORAL
Qty: 30 TABLET | Refills: 0 | Status: SHIPPED | OUTPATIENT
Start: 2023-01-08 | End: 2023-03-10

## 2023-01-07 RX ORDER — LIOTHYRONINE SODIUM 5 UG/1
5 TABLET ORAL
Status: DISCONTINUED | OUTPATIENT
Start: 2023-01-07 | End: 2023-01-07 | Stop reason: HOSPADM

## 2023-01-07 RX ORDER — ONDANSETRON 4 MG/1
4 TABLET, FILM COATED ORAL EVERY 8 HOURS PRN
Qty: 10 TABLET | Refills: 0 | Status: SHIPPED | OUTPATIENT
Start: 2023-01-07 | End: 2023-01-12

## 2023-01-07 RX ADMIN — LIOTHYRONINE SODIUM 5 MCG: 5 TABLET ORAL at 08:09

## 2023-01-07 RX ADMIN — LEVOTHYROXINE SODIUM 175 MCG: 0.17 TABLET ORAL at 04:49

## 2023-01-07 ASSESSMENT — PAIN DESCRIPTION - PAIN TYPE: TYPE: ACUTE PAIN;CHRONIC PAIN

## 2023-01-07 NOTE — CARE PLAN
The patient is Stable - Low risk of patient condition declining or worsening    Shift Goals  Clinical Goals: safety  Patient Goals: rest    Progress made toward(s) clinical / shift goals:    Problem: Pain - Standard  Goal: Alleviation of pain or a reduction in pain to the patient’s comfort goal  Outcome: Progressing     Problem: Fall Risk  Goal: Patient will remain free from falls  Outcome: Progressing       Patient is not progressing towards the following goals:

## 2023-01-07 NOTE — DISCHARGE INSTRUCTIONS
Follow up with her neuro-ophthalmologist in 1-2 weeks   Follow up  outpatient neurology clinic  Return to ED with worsening vision loss,  low blood pressure, severe fatigue,  numbness, weakness.

## 2023-01-07 NOTE — CARE PLAN
The patient is Stable - Low risk of patient condition declining or worsening    Shift Goals  Clinical Goals: safety  Patient Goals: go home    Progress made toward(s) clinical / shift goals:      Problem: Knowledge Deficit - Standard  Goal: Patient and family/care givers will demonstrate understanding of plan of care, disease process/condition, diagnostic tests and medications  Outcome: Progressing  Note: Pt updated on POC for the day.      Problem: Fall Risk  Goal: Patient will remain free from falls  Outcome: Progressing  Note: Bed low and locked with strip alarm on. Pt educated on fall precautions in place.        Patient is not progressing towards the following goals: n/a
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
## 2023-01-08 PROBLEM — H54.61 VISION LOSS OF RIGHT EYE: Status: ACTIVE | Noted: 2023-01-08

## 2023-01-08 PROBLEM — G36.0 NEUROMYELITIS OPTICA (DEVIC) (HCC): Status: ACTIVE | Noted: 2022-12-21

## 2023-01-08 LAB
BACTERIA CSF CULT: NORMAL
CENTROMERE IGG TITR SER IF: 0 AU/ML (ref 0–40)
CENTROMERE IGG TITR SER IF: 0 AU/ML (ref 0–40)
ENA JO1 AB TITR SER: 0 AU/ML (ref 0–40)
ENA JO1 AB TITR SER: 0 AU/ML (ref 0–40)
ENA SCL70 IGG SER QL: 0 AU/ML (ref 0–40)
ENA SCL70 IGG SER QL: 0 AU/ML (ref 0–40)
ENA SM IGG SER-ACNC: 18 AU/ML (ref 0–40)
ENA SM IGG SER-ACNC: 21 AU/ML (ref 0–40)
ENA SS-B IGG SER IA-ACNC: 0 AU/ML (ref 0–40)
ENA SS-B IGG SER IA-ACNC: 0 AU/ML (ref 0–40)
GRAM STN SPEC: NORMAL
OLIGOCLONAL BANDS CSF ELPH-IMP: NORMAL
OLIGOCLONAL BANDS CSF IEF: 0 BANDS (ref 0–1)
OLIGOCLONAL BANDS.IT SER+CSF QL: NEGATIVE
RIBOSOMAL P AB SER-ACNC: 6 AU/ML (ref 0–40)
RIBOSOMAL P AB SER-ACNC: 7 AU/ML (ref 0–40)
SIGNIFICANT IND 70042: NORMAL
SITE SITE: NORMAL
SOURCE SOURCE: NORMAL
SSA52 R0ENA AB IGG Q0420: 15 AU/ML (ref 0–40)
SSA52 R0ENA AB IGG Q0420: 17 AU/ML (ref 0–40)
SSA60 R0ENA AB IGG Q0419: 0 AU/ML (ref 0–40)
SSA60 R0ENA AB IGG Q0419: 0 AU/ML (ref 0–40)
T4BG SERPL-MCNC: 28.2 UG/ML (ref 13–30)
U1 SNRNP IGG SER QL: 55 UNITS (ref 0–19)
U1 SNRNP IGG SER QL: 57 UNITS (ref 0–19)

## 2023-01-08 NOTE — DISCHARGE SUMMARY
[facility] Internal Medicine Discharge Summary    Attending: Dr. Tobias Velázquez  Senior Resident: Dr. Mandy Cook  Intern:  Dr. Petey Muhammad  Contact Number: [phone]    CHIEF COMPLAINT ON ADMISSION  Chief Complaint   Patient presents with    Sent by MD     Opthamology    Loss of Vision     To right eye on November 5, 2022       Reason for Admission  Sent by MD     Admission Date  1/4/2023    CODE STATUS  Prior    HPI & HOSPITAL COURSE    Roxy Sandhu is a 67 y.o. female with past medical history of stage II ER/AL positive, HER2/ava nu negative breast cancer status post resection 2011 and TC based chemotherapy followed by adjuvant radiation therapy, idiopathic lower extremity neuropathy (possibly related to chemotherapy), migraines, who was referred to the ED by neuro-ophthalmology for acute vision loss secondary to optic neuritis confirmed on MRI with contrast on 11/5 to initiate high-dose steroids 1 g daily for 3 days.  MRI of C-spine and T-spine L-spine negative for demyelinating lesions.  Labs work-up notable for elevated aquaporin antibody at 28 and FELIX elevated at  1:2560, negative lupus anticoagulant, , free T4, initially undetectable less than 0.11, free T3 0.7, microsomal TPO above 600.  She was initiated on levothyroxine 175 mcg increase to 200 mcg daily and liothyronine 5 mcg daily and referred to outpatient endocrinology.  She was seen by neurology for optic neuritis, I was recommended that she continue high-dose steroids for 3 days and follow-up outpatient with both neurology and neuro-ophthalmology.  Her vision had improved compared to when she came in however had not resolved completely.  Upon discharge she was able to see shapes and movement from her right eye.  Repeat labs notable for free T3 of 0.96, free T4 0.48, .  Labs on discharge notable for mild leukocytosis likely secondary to his steroid use.  Thyroid ultrasound revealed 1.6 cm right lower thyroid nodule, 1.4 cm left upper  thyroid nodule.  She is to follow-up with endocrinology outpatient for outpatient FNA and follow-up of hypothyroidism    Therefore, she is discharged in guarded and stable condition to home with close outpatient follow-up.    The patient met 2-midnight criteria for an inpatient stay at the time of discharge.    Discharge Date  1/7/2023    Physical Exam on Day of Discharge  Physical Exam  Constitutional:       Appearance: Normal appearance.   HENT:      Head: Normocephalic and atraumatic.      Nose: Nose normal. No congestion.      Mouth/Throat:      Mouth: Mucous membranes are moist.      Pharynx: Oropharynx is clear.   Eyes:      General: Visual field deficit present.      Extraocular Movements: Extraocular movements intact.      Conjunctiva/sclera: Conjunctivae normal.      Pupils: Pupils are equal, round, and reactive to light.      Comments: Decreased vision of right eye, able to see movement and color.    Cardiovascular:      Rate and Rhythm: Normal rate and regular rhythm.      Pulses: Normal pulses.      Heart sounds: No murmur heard.  Pulmonary:      Effort: Pulmonary effort is normal.      Breath sounds: No wheezing or rales.   Abdominal:      General: Abdomen is flat. There is no distension.      Tenderness: There is no abdominal tenderness.   Musculoskeletal:         General: No swelling or deformity.      Cervical back: Normal range of motion. No rigidity.   Skin:     General: Skin is warm and dry.   Neurological:      Mental Status: She is alert and oriented to person, place, and time.      Motor: No weakness.      Comments: Decreased vision in right eye, patient able to see shapes and light.    Mild paresthesias along feet bilaterally   Psychiatric:      Comments: Anxious, tearful at times       FOLLOW UP ITEMS POST DISCHARGE  Follow up FNA for thyroid nodule  Follow-up with endocrinology  Follow-up with therapist outpatient    DISCHARGE DIAGNOSES  Principal Problem:    Optic neuritis POA: Yes  Active  Problems:    Other headache syndrome POA: Unknown    Idiopathic peripheral neuropathy POA: Unknown    Non-suicidal depressed mood POA: Unknown    Other specified hypothyroidism POA: Unknown    Thyroid nodule POA: Yes    Hypothyroidism due to Hashimoto's thyroiditis POA: Yes    History of breast cancer POA: Yes    Stress at home POA: Unknown  Resolved Problems:    * No resolved hospital problems. *      FOLLOW UP  No future appointments.  No follow-up provider specified.    MEDICATIONS ON DISCHARGE     Medication List        START taking these medications        Instructions   levothyroxine 200 MCG Tabs  Commonly known as: SYNTHROID   Take 1 Tablet by mouth every morning on an empty stomach.  Dose: 200 mcg     liothyronine 5 MCG Tabs  Start taking on: January 8, 2023  Commonly known as: CYTOMEL   Take 1 Tablet by mouth every morning before breakfast.  Dose: 5 mcg     ondansetron 4 MG Tabs tablet  Commonly known as: Zofran   Take 1 Tablet by mouth every 8 hours as needed for Nausea/Vomiting for up to 5 days.  Dose: 4 mg              Allergies  Allergies   Allergen Reactions    Bloodless        DIET  No orders of the defined types were placed in this encounter.      ACTIVITY  As tolerated.  Weight bearing as tolerated    CONSULTATIONS  Neurology  Endocrinology    PROCEDURES  NA  LABORATORY  Lab Results   Component Value Date    SODIUM 141 01/07/2023    POTASSIUM 3.6 01/07/2023    CHLORIDE 104 01/07/2023    CO2 22 01/07/2023    GLUCOSE 109 (H) 01/07/2023    BUN 24 (H) 01/07/2023    CREATININE 0.90 01/07/2023        Lab Results   Component Value Date    WBC 11.8 (H) 01/07/2023    HEMOGLOBIN 11.6 (L) 01/07/2023    HEMATOCRIT 34.9 (L) 01/07/2023    PLATELETCT 195 01/07/2023        Total time of the discharge process exceeds 35 minutes.

## 2023-01-09 LAB — MBP CSF-MCNC: 3.61 NG/ML (ref 0–5.5)

## 2023-01-10 ENCOUNTER — OFFICE VISIT (OUTPATIENT)
Dept: OPHTHALMOLOGY | Facility: MEDICAL CENTER | Age: 68
End: 2023-01-10
Payer: MEDICARE

## 2023-01-10 ENCOUNTER — HOSPITAL ENCOUNTER (OUTPATIENT)
Dept: LAB | Facility: MEDICAL CENTER | Age: 68
End: 2023-01-10
Attending: OPHTHALMOLOGY
Payer: MEDICARE

## 2023-01-10 DIAGNOSIS — E03.8 HYPOTHYROIDISM DUE TO HASHIMOTO'S THYROIDITIS: ICD-10-CM

## 2023-01-10 DIAGNOSIS — H46.9 OPTIC NEURITIS: ICD-10-CM

## 2023-01-10 DIAGNOSIS — E04.1 THYROID NODULE: ICD-10-CM

## 2023-01-10 DIAGNOSIS — E06.3 HYPOTHYROIDISM DUE TO HASHIMOTO'S THYROIDITIS: ICD-10-CM

## 2023-01-10 DIAGNOSIS — D89.89 AUTOIMMUNE DISORDER (HCC): ICD-10-CM

## 2023-01-10 PROCEDURE — 86225 DNA ANTIBODY NATIVE: CPT

## 2023-01-10 PROCEDURE — 86256 FLUORESCENT ANTIBODY TITER: CPT

## 2023-01-10 PROCEDURE — 99214 OFFICE O/P EST MOD 30 MIN: CPT | Performed by: OPHTHALMOLOGY

## 2023-01-10 PROCEDURE — 36415 COLL VENOUS BLD VENIPUNCTURE: CPT

## 2023-01-10 RX ORDER — PREDNISONE 20 MG/1
60 TABLET ORAL DAILY
Qty: 90 TABLET | Refills: 0 | Status: SHIPPED | OUTPATIENT
Start: 2023-01-10 | End: 2023-02-06

## 2023-01-10 ASSESSMENT — EXTERNAL EXAM - LEFT EYE: OS_EXAM: NORMAL

## 2023-01-10 ASSESSMENT — REFRACTION_WEARINGRX
SPECS_TYPE: SVL
OD_SPHERE: +2.50
OS_SPHERE: +2.50
OD_CYLINDER: SPHERE
OS_CYLINDER: SPHERE

## 2023-01-10 ASSESSMENT — REFRACTION_MANIFEST
OD_AXIS: 077
OD_SPHERE: -0.25
OS_SPHERE: -0.50
METHOD_AUTOREFRACTION: 1
OS_CYLINDER: +0.75
OD_CYLINDER: +0.50
OS_AXIS: 134

## 2023-01-10 ASSESSMENT — SLIT LAMP EXAM - LIDS
COMMENTS: NORMAL
COMMENTS: NORMAL

## 2023-01-10 ASSESSMENT — CONF VISUAL FIELD
OS_NORMAL: 1
OS_SUPERIOR_NASAL_RESTRICTION: 0
OD_INFERIOR_TEMPORAL_RESTRICTION: 1
OD_SUPERIOR_TEMPORAL_RESTRICTION: 1
OD_SUPERIOR_NASAL_RESTRICTION: 1
OS_INFERIOR_TEMPORAL_RESTRICTION: 0
OS_SUPERIOR_TEMPORAL_RESTRICTION: 0
OS_INFERIOR_NASAL_RESTRICTION: 0
OD_INFERIOR_NASAL_RESTRICTION: 1

## 2023-01-10 ASSESSMENT — VISUAL ACUITY
METHOD: SNELLEN - LINEAR
OS_SC: 20/30-2
OD_SC: 20/CF 1FT

## 2023-01-10 ASSESSMENT — ENCOUNTER SYMPTOMS: BLURRED VISION: 1

## 2023-01-10 ASSESSMENT — TONOMETRY
OS_IOP_MMHG: 15
IOP_METHOD: ICARE
OD_IOP_MMHG: 16

## 2023-01-10 ASSESSMENT — CUP TO DISC RATIO: OD_RATIO: 0.2

## 2023-01-10 ASSESSMENT — EXTERNAL EXAM - RIGHT EYE: OD_EXAM: NORMAL

## 2023-01-10 NOTE — ASSESSMENT & PLAN NOTE
12/21/2022-referred by [facility] for acute vision loss in the right November 5.  She was then seen at [facility] as well by Dr. 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  However he did not notice any disc edema.  She did undergo an MRI scan that was unfortunately done without contrast that demonstrated some optic nerve sheath dilation.  On examination today her visual acuity is light perception in the right eye and there is a right a fair pupillary defect.  An OCT of the optic nerve thickness is normal at 89 in the right and 72 in the left with normal ganglion cell on the right.  Therefore this history is not consistent with a nonarteritic ischemic optic neuropathy and that went over the expected continued swelling noted on dilated examination 10 days later.  This could easily be consistent with a an acute central retinal artery occlusion.  Trevor York did a fluorescein angiogram but I do not have any results to that effect.  However given the optic nerve sheath dilation on MRI scan this could be consistent with a retrobulbar optic neuritis.  However the MRI scan was done without contrast so one does not know where the optic nerve would enhance.  I am therefore we are ordering an MRI scan with gadolinium as well as ordering routine blood work for which she has not done in a while.  Also to obtain further serological testing as well as CT angiogram of the head and neck.  Sending a note back to Dr. York insofar as if he never did a fluorescein angiogram this might help in securing a diagnosis.  1/3/2022 - Review of MRI and labs thus far. MRI demonstrates enhancement to the right optic nerve. MOG - negative, but aquaporin 4 -0 elevated. ACE normal but FELIX extremely high at 1:2560. T4, T3 and Free T4 all low (no TSH). Thus discussed at length with patient. Since still enhancement of the optic nerve would recommend admission to [facility]  for at least 3 days of high dose IV solumedrol 1 gm per day. Needs TSH, connective tissue disease profile, lupus anticoagulant, anti phospholipid antibody, (Rheumatology Consult) PT, PTT, MRI cervical, thoracis and lumbar spine with contrast looking for other enhancing lesion, Lumbar puncture with cytology (history of breast CA), oligoclonal bands and myelin basic protein (Neurologiy consult). If no improvement in the vision on the IV solumedrol and work up pointing to a demyelinating disease then should consider IVIG or PLEX. Discussed with patient. Is getting CTA head and neck tomoroow late morning so recommended that after she present to the [facility] emergency room to initiate work up and get admitted.  1/10/2023-was admitted to [facility] for 3 days of IV steroids.  Also further work-up.  Following the steroids her vision has improved to counting fingers.  Her work-up is extensive and did not reveal any enhancing lesions involving her cervical thoracic or lumbar spine.  However her aqua porn for antibody was negative.  Myelin basic protein as well as oligoclonal bands were negative but her CSF protein was 69.  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  I restarted prednisone at 60 mg a day to taper by 20 mg over 3 weeks.  I would like to get her seen by neuro immunology to determine further treatments.

## 2023-01-10 NOTE — Clinical Note
Very complex patient combination of probable NMO, Hashimotos, Thyroid nodule, and extremely elevated FELIX with smith and anticardiolipin ab positive. Was hoping that you all could assist in her care. She was recently admitted for three days of IV steroids then Dc'd. I placed back on 60 mg prednisone today.

## 2023-01-10 NOTE — ASSESSMENT & PLAN NOTE
1/10/2023-and her further work-up she was found to have an extremely elevated antinuclear antibody.  Her Roth antibody was also somewhat elevated as well as anticardiolipin antibodies.  We will obtain a double-stranded DNA refer to rheumatology help better characterize her autoimmune process and also possibly to make further recommendations for treatments in association with her NMO.

## 2023-01-10 NOTE — ASSESSMENT & PLAN NOTE
1/10/2023-she was found to have extreme hypothyroidism consistent with Hashimoto's.  However thyroid ultrasound also discovered a thyroid nodule that was recommended to biopsy.  Her TPO antibodies are also extremely elevated.  We will therefore refer her to endocrinology for further management of her hypothyroidism as well as to consider thyroid needle biopsy.  She has not picked up her thyroid medications following discharge and I stressed the importance of thyroid replacement.

## 2023-01-10 NOTE — PROGRESS NOTES
Peds/Neuro Ophthalmology:   John Pantoja M.D.    Date & Time note created:    1/10/2023   12:20 PM     Referring MD / APRN:  Pcp Pt States None, No att. providers found    Patient ID:  Name:             Roxy Snadhu     YOB: 1955  Age:                 67 y.o.  female   MRN:               [id number]    Chief Complaint/Reason for Visit:     Optic Neuropathy      History of Present Illness:    Roxy Sandhu is a 67 y.o. female   Follow up optic neuropathy right eye with improvement since last visit.Left eye seeing well.Only wears readers.      Review of Systems:  Review of Systems   Eyes:  Positive for blurred vision.   All other systems reviewed and are negative.    Past Medical History:   Past Medical History:   Diagnosis Date    Breast cancer (HCC)     Breath shortness     Cancer (HCC) 2011    breast       Past Surgical History:  Past Surgical History:   Procedure Laterality Date    LUMPECTOMY  09/02/2011    Performed by DOREEN GARCIA at SURGERY SAME DAY [facility]    NODE BIOPSY SENTINEL  09/02/2011    Performed by DOREEN GARCIA at SURGERY SAME DAY [facility]    EYE SURGERY         Current Outpatient Medications:  Current Outpatient Medications   Medication Sig Dispense Refill    predniSONE (DELTASONE) 20 MG Tab Take 3 Tablets by mouth every day for 7 days. After 7 days decrease to 40 mg per day (2 tabs) then after 7 days decrease to 20 mg per day ( one tablet) maintain on 20 mg (one table until seen again by MD) 90 Tablet 0    liothyronine (CYTOMEL) 5 MCG Tab Take 1 Tablet by mouth every morning before breakfast. 30 Tablet 0    ondansetron (ZOFRAN) 4 MG Tab tablet Take 1 Tablet by mouth every 8 hours as needed for Nausea/Vomiting for up to 5 days. 10 Tablet 0    levothyroxine (SYNTHROID) 200 MCG Tab Take 1 Tablet by mouth every morning on an empty stomach. (Patient not taking: Reported on 1/10/2023) 30 Tablet 0     No current facility-administered medications for this  visit.       Allergies:  Allergies   Allergen Reactions    Bloodless        Family History:  Family History   Problem Relation Age of Onset    Diabetes Mother        Social History:  Social History     Socioeconomic History    Marital status: Single     Spouse name: Not on file    Number of children: Not on file    Years of education: Not on file    Highest education level: Not on file   Occupational History    Not on file   Tobacco Use    Smoking status: Never    Smokeless tobacco: Never   Vaping Use    Vaping Use: Never used   Substance and Sexual Activity    Alcohol use: Not Currently     Comment: Occasional     Drug use: Not Currently     Types: Marijuana    Sexual activity: Not on file   Other Topics Concern    Not on file   Social History Narrative    disabled     Social Determinants of Health     Financial Resource Strain: Not on file   Food Insecurity: Not on file   Transportation Needs: Not on file   Physical Activity: Not on file   Stress: Not on file   Social Connections: Not on file   Intimate Partner Violence: Not on file   Housing Stability: Not on file          Physical Exam:  Physical Exam    Oriented x 3  Weight/BMI: There is no height or weight on file to calculate BMI.  There were no vitals taken for this visit.    Base Eye Exam       Visual Acuity (Snellen - Linear)         Right Left    Dist sc 20/Cf 1ft 20/30-2              Tonometry (icare, 10:21 AM)         Right Left    Pressure 16 15              Pupils         APD    Right +1    Left               Visual Fields         Right Left      Full                                Neuro/Psych       Oriented x3: Yes    Mood/Affect: difficult historian              Dilation       Both eyes: able to view without dilation                   Additional Tests       Color         Right Left    Ishihara 0/9 9/9              Stereo       Fly: -                  Slit Lamp and Fundus Exam       External Exam         Right Left    External Normal Normal               Slit Lamp Exam         Right Left    Lids/Lashes Normal Normal    Conjunctiva/Sclera White and quiet White and quiet    Cornea Clear Clear    Anterior Chamber Deep and quiet Deep and quiet    Iris Round and reactive Round and reactive    Lens Clear Posterior chamber intraocular lens    Vitreous Normal Normal              Fundus Exam         Right Left    Disc Normal Tilted, with chichi pap staphyloma    C/D Ratio 0.2     Macula Normal RPE degeneration    Vessels Normal Normal    Periphery Normal Normal                  Refraction       Wearing Rx         Sphere Cylinder    Right +2.50 Sphere    Left +2.50 Sphere      Age: 1yr    Type: SVL              Manifest Refraction (Auto)         Sphere Cylinder Axis    Right -0.25 +0.50 077    Left -0.50 +0.75 134                    Pertinent Lab/Test/Imaging Review:      Assessment and Plan:     Optic neuritis  12/21/2022-referred by [facility] for acute vision loss in the right November 5.  She was then seen at [facility] as well by Dr. 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  However he did not notice any disc edema.  She did undergo an MRI scan that was unfortunately done without contrast that demonstrated some optic nerve sheath dilation.  On examination today her visual acuity is light perception in the right eye and there is a right a fair pupillary defect.  An OCT of the optic nerve thickness is normal at 89 in the right and 72 in the left with normal ganglion cell on the right.  Therefore this history is not consistent with a nonarteritic ischemic optic neuropathy and that went over the expected continued swelling noted on dilated examination 10 days later.  This could easily be consistent with a an acute central retinal artery occlusion.  Suspect Jaylen did a fluorescein angiogram but I do not have any results to that effect.  However given the optic nerve sheath dilation on MRI scan  this could be consistent with a retrobulbar optic neuritis.  However the MRI scan was done without contrast so one does not know where the optic nerve would enhance.  I am therefore we are ordering an MRI scan with gadolinium as well as ordering routine blood work for which she has not done in a while.  Also to obtain further serological testing as well as CT angiogram of the head and neck.  Sending a note back to Dr. York insofar as if he never did a fluorescein angiogram this might help in securing a diagnosis.  1/3/2022 - Review of MRI and labs thus far. MRI demonstrates enhancement to the right optic nerve. MOG - negative, but aquaporin 4 -0 elevated. ACE normal but FELIX extremely high at 1:2560. T4, T3 and Free T4 all low (no TSH). Thus discussed at length with patient. Since still enhancement of the optic nerve would recommend admission to [facility] for at least 3 days of high dose IV solumedrol 1 gm per day. Needs TSH, connective tissue disease profile, lupus anticoagulant, anti phospholipid antibody, (Rheumatology Consult) PT, PTT, MRI cervical, thoracis and lumbar spine with contrast looking for other enhancing lesion, Lumbar puncture with cytology (history of breast CA), oligoclonal bands and myelin basic protein (Neurologiy consult). If no improvement in the vision on the IV solumedrol and work up pointing to a demyelinating disease then should consider IVIG or PLEX. Discussed with patient. Is getting CTA head and neck tomoroow late morning so recommended that after she present to the [facility] emergency room to initiate work up and get admitted.  1/10/2023-was admitted to [facility] for 3 days of IV steroids.  Also further work-up.  Following the steroids her vision has improved to counting fingers.  Her work-up is extensive and did not reveal any enhancing lesions involving her cervical thoracic or lumbar spine.  However her aqua porn for antibody was negative.  Myelin basic protein as well as oligoclonal bands  were negative but her CSF protein was 69.  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  I restarted prednisone at 60 mg a day to taper by 20 mg over 3 weeks.  I would like to get her seen by neuro immunology to determine further treatments.    Hypothyroidism due to Hashimoto's thyroiditis  1/10/2023-she was found to have extreme hypothyroidism consistent with Hashimoto's.  However thyroid ultrasound also discovered a thyroid nodule that was recommended to biopsy.  Her TPO antibodies are also extremely elevated.  We will therefore refer her to endocrinology for further management of her hypothyroidism as well as to consider thyroid needle biopsy.  She has not picked up her thyroid medications following discharge and I stressed the importance of thyroid replacement.    Autoimmune disorder (HCC)  1/10/2023-and her further work-up she was found to have an extremely elevated antinuclear antibody.  Her Roth antibody was also somewhat elevated as well as anticardiolipin antibodies.  We will obtain a double-stranded DNA refer to rheumatology help better characterize her autoimmune process and also possibly to make further recommendations for treatments in association with her NMO.    Thyroid nodule  1/10/2023-refer to endocrinology for biopsy        John Pantoja M.D.

## 2023-01-11 ENCOUNTER — TELEPHONE (OUTPATIENT)
Dept: SCHEDULING | Facility: IMAGING CENTER | Age: 68
End: 2023-01-11

## 2023-01-12 ENCOUNTER — TELEPHONE (OUTPATIENT)
Dept: HEALTH INFORMATION MANAGEMENT | Facility: OTHER | Age: 68
End: 2023-01-12
Payer: MEDICARE

## 2023-01-13 LAB — DSDNA AB TITR SER CLIF: 53 IU (ref 0–24)

## 2023-01-15 LAB — DSDNA IGG TITR SER CLIF: NORMAL {TITER}

## 2023-01-19 ENCOUNTER — TELEPHONE (OUTPATIENT)
Dept: MEDICAL GROUP | Facility: PHYSICIAN GROUP | Age: 68
End: 2023-01-19
Payer: MEDICARE

## 2023-01-19 NOTE — TELEPHONE ENCOUNTER
Future Appointments         Provider Department Center    1/26/2023 2:20 PM (Arrive by 2:05 PM) Nisa Lloyd D.N.P. [facility] [place] [facility]    2/1/2023 2:30 PM John Pantoja M.D. [facility] - Ophthalmology     4/10/2023 1:30 PM (Arrive by 1:15 PM) Michael Tellez M.D. [facility] Rheumatology     4/24/2023 9:20 AM Crescencio Ecsobar M.D. [facility] Neurology           NEW PATIENT VISIT PRE-VISIT PLANNING    1.  [employer] Patient is checked in Patient Demographics?Yes    2.  Immunizations were updated in Epic using Reconcile Outside Information activity? No, Nothing in WebIZ         3.  Is this appointment scheduled as a Hospital Follow-Up? No    4.  Patient is due for the following Health Maintenance Topics:   Health Maintenance Due   Topic Date Due    BONE DENSITY  Never done    CERVICAL CANCER SCREENING  Never done    Annual Wellness Visit  Never done    HEPATITIS C SCREENING  Never done    COVID-19 Vaccine (1) Never done    IMM DTaP/Tdap/Td Vaccine (1 - Tdap) Never done    COLORECTAL CANCER SCREENING  Never done    IMM ZOSTER VACCINES (1 of 2) Never done    MAMMOGRAM  07/15/2016    IMM INFLUENZA (1) Never done    IMM PNEUMOCOCCAL VACCINE: 65+ Years (1 - PCV) Never done     5.  Reviewed/Updated the following with patient:          Preferred Pharmacy? Yes          Preferred Lab? Yes          Preferred Communication? Yes          Allergies? Yes          Medications? YES. Was Abstract Encounter opened and chart updated? YES          Social History? Yes          Family History (document living status of immediate family members and if + hx of  cancer, diabetes, hypertension, hyperlipidemia, heart attack, stroke) Yes    6.  Updated Care Team?          DME Company (gait device, O2, CPAP, etc.) NO          Other Specialists (eye doctor, derm, GYN, cardiology, endo, etc): N\A    7.  AHA (Puls[employer]) form printed for Provider? N/A  Pt was reminded of her check in time.

## 2023-01-20 ENCOUNTER — OFFICE VISIT (OUTPATIENT)
Dept: RHEUMATOLOGY | Facility: MEDICAL CENTER | Age: 68
End: 2023-01-20
Attending: STUDENT IN AN ORGANIZED HEALTH CARE EDUCATION/TRAINING PROGRAM
Payer: MEDICARE

## 2023-01-20 VITALS
WEIGHT: 150.2 LBS | RESPIRATION RATE: 16 BRPM | DIASTOLIC BLOOD PRESSURE: 100 MMHG | SYSTOLIC BLOOD PRESSURE: 142 MMHG | BODY MASS INDEX: 26.61 KG/M2 | TEMPERATURE: 98.8 F | HEIGHT: 63 IN | OXYGEN SATURATION: 97 % | HEART RATE: 83 BPM

## 2023-01-20 DIAGNOSIS — Z79.899 LONG-TERM USE OF HYDROXYCHLOROQUINE: ICD-10-CM

## 2023-01-20 DIAGNOSIS — H46.9 OPTIC NEURITIS: ICD-10-CM

## 2023-01-20 DIAGNOSIS — M35.1 MIXED CONNECTIVE TISSUE DISEASE (HCC): ICD-10-CM

## 2023-01-20 PROCEDURE — 99204 OFFICE O/P NEW MOD 45 MIN: CPT | Performed by: STUDENT IN AN ORGANIZED HEALTH CARE EDUCATION/TRAINING PROGRAM

## 2023-01-20 RX ORDER — HYDROXYCHLOROQUINE SULFATE 200 MG/1
200 TABLET, FILM COATED ORAL DAILY
Qty: 90 TABLET | Refills: 3 | Status: SHIPPED | OUTPATIENT
Start: 2023-01-20 | End: 2023-03-10

## 2023-01-20 ASSESSMENT — FIBROSIS 4 INDEX: FIB4 SCORE: 2.121320343559642573

## 2023-01-20 NOTE — PROGRESS NOTES
[facility] RHEUMATOLOGY  [address], [address], [place] [address]  Phone: [phone] ? Fax: [phone]    RHEUMATOLOGY NEW PATIENT VISIT NOTE      DATE OF SERVICE: 01/20/2023         Subjective     REFERRING PRACTITIONER:  John Pantoja M.D.  [address]  [place] [address]    PATIENT IDENTIFICATION:  Roxy Sandhu  [address] [facility] [address]    YOB: 1955    MEDICAL RECORD NUMBER: [id number]         CHIEF COMPLAINT:   Chief Complaint   Patient presents with    New Patient     Optic neuritis       HISTORY OF PRESENT ILLNESS:  Roxy Sandhu is a 67 y.o. female with pertinent history notable for optic neuritis, Hashimoto's thyroiditis with hypothyroidism, DJD/DDD of cervical/thoracic spines. Presents for rheumatologic evaluation in the setting of positive FELIX and other autoantibodies. Reports chronic gradually progressive waxing/waning course of symptoms including visual disturbances, joint/muscle pain in her hands, knees, and neck/cervical region, variable degrees of morning stiffness, tingling/numbness/burning sensation in hands/feet, and body aches. Experienced some improvement following a course of high-dose prednisone taper prescribed by neuro-ophthalmology.    Pertinent treatment history as of 1/2023: Prednisone 60 mg taper (helpful).    Pertinent lab results as of 1/2023: Strongly positive FELIX >1:2560 speckled pattern with positive anti-Smith/RNP 55 and anti-dsDNA 53; positive IgM anti-CL 22; positive anti-AQP4R 28.2; strongly positive anti-TPO >600 with low free T4 of 0.48 and high TSH of 123; elevated ESR 30 with normal CRP; negative/normal LAC, anti-MOG, MBP, ACE, LFT, and treponema/syphilis.    REVIEW OF SYSTEMS:  Except as noted in the history above, relevant review of systems with emphasis on autoimmune rheumatic diseases was otherwise negative.      ACTIVE PROBLEM LIST:  Patient Active Problem List    Diagnosis Date Noted    Mixed connective  tissue disease (HCC) 01/20/2023    Long-term use of hydroxychloroquine 01/20/2023    Autoimmune disorder (HCC) 01/10/2023    Vision loss of right eye 01/08/2023    Non-suicidal depressed mood 01/07/2023    Other specified hypothyroidism 01/07/2023    Thyroid nodule 01/07/2023    Hypothyroidism due to Hashimoto's thyroiditis 01/07/2023    Other headache syndrome 01/05/2023    Stress at home 01/05/2023    Idiopathic peripheral neuropathy 01/05/2023    History of breast cancer 01/04/2023    Optic neuritis 12/21/2022    Pseudophakia of left eye 12/21/2022    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 12/21/2022       PAST MEDICAL HISTORY:  Past Medical History:   Diagnosis Date    Breast cancer ([facility])     Breath shortness     Cancer ([facility]) 2011    breast       PAST SURGICAL HISTORY:  Past Surgical History:   Procedure Laterality Date    LUMPECTOMY  09/02/2011    Performed by DOREEN GARCIA at SURGERY SAME DAY [facility]    NODE BIOPSY SENTINEL  09/02/2011    Performed by DOREEN GARCIA at SURGERY SAME DAY [facility]    EYE SURGERY         MEDICATIONS:  Current Outpatient Medications   Medication Sig    hydroxychloroquine (PLAQUENIL) 200 MG Tab Take 1 Tablet by mouth every day.    levothyroxine (SYNTHROID) 200 MCG Tab Take 1 Tablet by mouth every morning on an empty stomach.    liothyronine (CYTOMEL) 5 MCG Tab Take 1 Tablet by mouth every morning before breakfast.       ALLERGIES:   Allergies   Allergen Reactions    Bloodless        IMMUNIZATIONS:  There is no immunization history on file for this patient.    SOCIAL HISTORY:   Social History     Socioeconomic History    Marital status: Single   Tobacco Use    Smoking status: Never    Smokeless tobacco: Never   Vaping Use    Vaping Use: Never used   Substance and Sexual Activity    Alcohol use: Not Currently     Comment: Occasional     Drug use: Not Currently     Types: Marijuana   Social History Narrative    disabled       FAMILY  "HISTORY:  Family History   Problem Relation Age of Onset    Diabetes Mother             Objective     Vital Signs: BP (!) 142/100 (BP Location: Left arm, Patient Position: Sitting, BP Cuff Size: Adult)   Pulse 83   Temp 37.1 °C (98.8 °F) (Temporal)   Resp 16   Ht 1.6 m (5' 3\")   Wt 68.1 kg (150 lb 3.2 oz)   SpO2 97% Body mass index is 26.61 kg/m².    General: Appears well and comfortable  Eyes: Poor visual acuity in both eyes; no scleral or conjunctival lesions  ENT: No apparent oral or nasal lesions  Head/Neck: No apparent scalp or neck lesions  Cardiovascular: Regular rate and rhythm; no pericardial rubs  Respiratory: Breathing quiet and unlabored; no rales or pleural rubs  Gastrointestinal: No organomegaly or abdominal masses  Integumentary: No significant cutaneous lesions or discolorations  Musculoskeletal: No significant joint tenderness, periarticular soft tissue swelling, warmth, erythema, or overt signs of synovitis; no significant restriction in range of motion of joints examined  Neurologic: Minimal paresthesia on hands and feet; no focal motor deficits  Psychiatric: Mood and affect appropriate      LABORATORY RESULTS REVIEWED AND INTERPRETED BY ME:  Lab Results   Component Value Date/Time    SEDRATEWES 30 (H) 12/21/2022 10:08 AM    CREACTPROT <0.30 12/21/2022 10:08 AM     Lab Results   Component Value Date/Time    ANTINUCAB Detected (A) 12/21/2022 10:08 AM     Lab Results   Component Value Date/Time    ANTIDNADS 53 (H) 01/10/2023 12:29 PM    SMITHAB 18 01/05/2023 06:25 PM    RNPAB 55 (H) 01/05/2023 06:25 PM    CENTROMBAB 0 01/05/2023 06:25 PM    LJLRCXM19 0 01/05/2023 06:25 PM    SSA60 0 01/05/2023 06:25 PM    SSA52 15 01/05/2023 06:25 PM    ANTISSBSJ 0 01/05/2023 06:25 PM    JO1AB 0 01/05/2023 06:25 PM     Lab Results   Component Value Date/Time    IGACARDIOLI <10 01/04/2023 05:31 PM    IGMCARDIOLI 22 (H) 01/04/2023 05:31 PM    IGGCARDIOLI <10 01/04/2023 05:31 PM    RUSSELVIPER 38.2 01/04/2023 " 05:31 PM    PROTHROMBTM 13.5 01/04/2023 05:31 PM    PROTHROMBTM 13.8 01/04/2023 05:31 PM    INR 1.04 01/04/2023 05:31 PM    INR 1.07 01/04/2023 05:31 PM     Lab Results   Component Value Date/Time    MICROSOMALA >600.0 (H) 01/06/2023 09:17 AM    TSHULTRASEN 123.000 (H) 01/04/2023 05:31 PM    FREET4 0.48 (L) 01/07/2023 07:27 AM     Lab Results   Component Value Date/Time    ASTSGOT 18 01/07/2023 07:27 AM    ALTSGPT 8 01/07/2023 07:27 AM    ALKPHOSPHAT 51 01/07/2023 07:27 AM    TBILIRUBIN 1.2 01/07/2023 07:27 AM    TOTPROTEIN 7.4 01/07/2023 07:27 AM    ALBUMIN 4.2 01/07/2023 07:27 AM     Lab Results   Component Value Date/Time    SODIUM 141 01/07/2023 07:27 AM    POTASSIUM 3.6 01/07/2023 07:27 AM    CHLORIDE 104 01/07/2023 07:27 AM    CO2 22 01/07/2023 07:27 AM    GLUCOSE 109 (H) 01/07/2023 07:27 AM    BUN 24 (H) 01/07/2023 07:27 AM    CREATININE 0.90 01/07/2023 07:27 AM    CALCIUM 8.9 01/07/2023 07:27 AM     Lab Results   Component Value Date/Time    WBC 11.8 (H) 01/07/2023 07:27 AM    RBC 3.60 (L) 01/07/2023 07:27 AM    HEMOGLOBIN 11.6 (L) 01/07/2023 07:27 AM    HEMATOCRIT 34.9 (L) 01/07/2023 07:27 AM    MCV 96.9 01/07/2023 07:27 AM    MCH 32.2 01/07/2023 07:27 AM    MCHC 33.2 (L) 01/07/2023 07:27 AM    RDW 51.1 (H) 01/07/2023 07:27 AM    PLATELETCT 195 01/07/2023 07:27 AM    MPV 9.9 01/07/2023 07:27 AM    NEUTS 10.66 (H) 01/07/2023 07:27 AM    LYMPHOCYTES 4.40 (L) 01/07/2023 07:27 AM    MONOCYTES 4.60 01/07/2023 07:27 AM    EOSINOPHILS 0.00 01/07/2023 07:27 AM    BASOPHILS 0.10 01/07/2023 07:27 AM     Lab Results   Component Value Date/Time    ACESERUM 39 12/21/2022 10:08 AM     Lab Results   Component Value Date/Time    COLORURINE Yellow 12/16/2012 09:49 PM    SPECGRAVITY 1.016 12/16/2012 09:49 PM    PHURINE 6.5 12/16/2012 09:49 PM    GLUCOSEUR Negative 12/16/2012 09:49 PM    KETONES 80 (A) 12/16/2012 09:49 PM    PROTEINURIN Negative 12/16/2012 09:49 PM       RADIOLOGY RESULTS REVIEWED AND INTERPRETED BY  ME:  Results for orders placed during the hospital encounter of 03/19/09    DX-KNEES-AP BILATERAL STANDING    Impression  IMPRESSION:    TRACE MEDIAL FEMOROTIBIAL COMPARTMENTAL JOINT SPACE NARROWING BUT NO  MARGINAL OSTEOPHYTE FORMATION.    Results for orders placed during the hospital encounter of 03/14/10    DX-KNEE 2-    Impression  IMPRESSION:    NO EVIDENCE OF FRACTURE.    Results for orders placed during the hospital encounter of 03/19/09    DX-CERVICAL SPINE-2 OR 3 VIEWS    Impression  IMPRESSION:    UNCHANGED, MILD C3-4 AND C5-6 DEGENERATIVE DISC DISEASE.    Results for orders placed during the hospital encounter of 03/14/10    CT-LSPINE W/O PLUS RECONS    Impression  IMPRESSION:    1. NO EVIDENCE OF LUMBAR SPINE FRACTURE.    2. MULTILEVEL MILD DEGENERATIVE DISC DISEASE.    Results for orders placed during the hospital encounter of 03/14/10    CT-TSPINE W/O PLUS RECONS    Impression  IMPRESSION:    1. NO EVIDENCE OF ACUTE THORACIC SPINE FRACTURE.    2. MULTILEVEL DEGENERATIVE DISC DISEASE.    Results for orders placed during the hospital encounter of 08/02/14    MR-KNEE-W/O    Impression  1.  Severe lateral femoral condyle marrow edema with suspected slight articular surface flattening. Alternatively this may just represent severe stress response    2.  Medial femoral condyle weightbearing surface full-thickness cartilage defect with subchondral spurring and small cartilage flap    3.  Intact ligaments and menisci    Results for orders placed during the hospital encounter of 01/04/23    MR-CERVICAL SPINE-WITH & W/O    Impression  1.  Mild discal degenerative changes throughout cervical region with mild marginal osteophytosis.    2.  Minimal to mild multilevel cervical spondylotic change.    3.  No evidence of demyelinating process in the cervical cord.    Results for orders placed during the hospital encounter of 09/27/11    NM-BONE SCAN WHOLE BODY    Impression  1. No focus of abnormal activity is  appreciated that would indicate metastasis at this time.    2. Some elevated activity in the shoulders and feet and ankles is noted that is likely related to degenerative changes.      All relevant laboratory and imaging results reported on this note were reviewed and interpreted by me.         Assessment & Plan     Roxy Sandhu is a 67 y.o. female with history as noted above whose presentation merits the following diagnostic and clinical status impressions and recommendations:    1. Mixed connective tissue disease (HCC)  Clinical picture is compatible with MCTD based on the pattern of her symptomatology and immunologic profile (strongly positive FELIX with anti-Smith/RNP, anti-dsDNA, and IgM anti-CL with no history of hypercoagulability). This disease entity classically encompasses clinical and serologic features of systemic lupus overlapping with inflammatory myopathy or systemic sclerosis. In this case however, she is much more serologically active as her clinical manifestations are less mucocutaneous and musculoskeletal, and more neurologic with involvement of her optic nerve. In any case, to prevent further disease evolution and progression, reasonable to initiate immunomodulatory therapy with hydroxychloroquine. In the meantime, need to complete her immunologic profiling with the additional work-up noted below for exclusionary and risk stratification purposes.  - BETA-2 GLYCOPROTEIN I AB,G,A,M  - COMPLEMENT C3  - COMPLEMENT C4  - COMPLEMENT TOTAL (CH50)  - hydroxychloroquine (PLAQUENIL) 200 MG Tab; Take 1 Tablet by mouth every day.  Dispense: 90 Tablet; Refill: 3  - Consider escalation to immunosuppressive therapy with mycophenolate depending on her clinical trajectory    2. 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  - Established with neuro-ophthalmology    3. Long-term use of hydroxychloroquine  Minimal  to no risk of retinal toxicity given the low dose of hydroxychloroquine prescribed (less than 5 mg/kg of body weight) per rheumatology and ophthalmology guidelines. However, ophthalmologic evaluation is still recommended with the frequency of routine follow-up eye exams determined by the ophthalmologist, typically annually or every other year.  - Routine ophthalmology evaluation as recommended      FOLLOW-UP: Return in about 3 months (around 4/20/2023) for Short.         Thank you for the opportunity to participate in the care of Roxy Sandhu.    Michael Tellez MD, MS  Rheumatologist, [facility] Rheumatology ? [facility]   of Clinical Medicine, Department of Internal Medicine  Formerly [facility], [facility] ? General acute hospital [facility]

## 2023-01-20 NOTE — PATIENT INSTRUCTIONS
AFTER VISIT INSTRUCTIONS    Below are important information to help you navigate your healthcare needs and help us serve you safely and effectively:  If laboratory tests and/or imaging studies were ordered, remember to go get them done as instructed.  If new prescriptions and/or refills were sent, remember to go pick them up from your local pharmacy, or call the specialty pharmacy to request shipment.  Always take your prescription medications exactly as prescribed unless instructed otherwise.  Note that antirheumatic drugs and steroids are immunosuppressive which means they increase your risk of infections and have multiple potential adverse effects on various organ systems in your body, though most of them are uncommon.  It is important that you are up-to-date on age-appropriate immunizations, particularly shingles and bacterial/viral pneumonia vaccines, which you can request from me or your primary care provider.  Be sure to read the drug package inserts to learn about the potential side effects of your medications before you start taking them.  If you experience any significant drug side effects, stop taking the medication and notify me promptly, and depending on the severity of the side effects, consider going to an urgent care or emergency department for immediate attention.  If there are significant findings on your lab tests and imaging studies that warrant further action, I will notify you with explanations via [email or web address] or phone call, otherwise you can view them on [email or web address] and let me know if you have any questions.  Note that [email or web address] messages are typically read during office hours and may take 1-7 business days before a response depending on the urgency of the situation and how busy my clinic schedule is.  In general, [email or web address] messaging is for non-urgent matters that do not require immediate attention, so for urgent matters that cannot wait, you are advised to go to an urgent care.  Lastly, you are granted  MyChart access to my documentation of your visit and are encouraged to read my note which details my assessment and plan for your condition.

## 2023-01-26 ENCOUNTER — OFFICE VISIT (OUTPATIENT)
Dept: MEDICAL GROUP | Facility: PHYSICIAN GROUP | Age: 68
End: 2023-01-26
Attending: STUDENT IN AN ORGANIZED HEALTH CARE EDUCATION/TRAINING PROGRAM
Payer: MEDICARE

## 2023-01-26 VITALS
HEART RATE: 97 BPM | DIASTOLIC BLOOD PRESSURE: 92 MMHG | TEMPERATURE: 100.2 F | SYSTOLIC BLOOD PRESSURE: 142 MMHG | BODY MASS INDEX: 25.16 KG/M2 | WEIGHT: 142 LBS | OXYGEN SATURATION: 96 % | RESPIRATION RATE: 16 BRPM | HEIGHT: 63 IN

## 2023-01-26 DIAGNOSIS — E04.1 THYROID NODULE: ICD-10-CM

## 2023-01-26 DIAGNOSIS — Z85.3 HISTORY OF BREAST CANCER: ICD-10-CM

## 2023-01-26 DIAGNOSIS — H46.9 OPTIC NEURITIS: ICD-10-CM

## 2023-01-26 DIAGNOSIS — D89.89 AUTOIMMUNE DISORDER (HCC): ICD-10-CM

## 2023-01-26 DIAGNOSIS — E03.8 HYPOTHYROIDISM DUE TO HASHIMOTO'S THYROIDITIS: ICD-10-CM

## 2023-01-26 DIAGNOSIS — Z76.89 ENCOUNTER TO ESTABLISH CARE: ICD-10-CM

## 2023-01-26 DIAGNOSIS — M35.1 MIXED CONNECTIVE TISSUE DISEASE (HCC): ICD-10-CM

## 2023-01-26 DIAGNOSIS — E06.3 HYPOTHYROIDISM DUE TO HASHIMOTO'S THYROIDITIS: ICD-10-CM

## 2023-01-26 DIAGNOSIS — Z13.228 SCREENING FOR METABOLIC DISORDER: ICD-10-CM

## 2023-01-26 PROCEDURE — 99204 OFFICE O/P NEW MOD 45 MIN: CPT | Performed by: NURSE PRACTITIONER

## 2023-01-26 ASSESSMENT — FIBROSIS 4 INDEX: FIB4 SCORE: 2.121320343559642573

## 2023-01-26 NOTE — PROGRESS NOTES
Subjective:     CC:    Chief Complaint   Patient presents with    Establish Care     Discharged from hospital loss of eyesight.         HISTORY OF THE PRESENT ILLNESS: Patient is a 67 y.o. female, here today with  to establish care. Prior PCP was Dr Garcia, last seen over 20 years ago. Medical history of stage II right breast cancer in 2011 s/p right partial mastectomy, chemo + radiation completed 3/2012. Newly diagnosed with right optic neuritis, mixed connective tissue disease, hypothyroidism.     States she has been doing well all these years and suddenly  experienced acute right vision loss in 11/2022. [facility] referred her to Dr Pantoja who she first met on 12/21/2022. Her MRI was repeated with contrast, demonstrated enhancement of right optic nerve. She was admitted at [facility] on 1/4/2023 and received 3 day course of IV steroids followed by tapered high dose oral steroids. Work up showed elevated antinuclear antibodies & she was referred to rheumatology. She met with Dr Tellez on 1/20/2023 and has been started on treatment for mixed connective tissue disease with Plaquenil. Additional lab work was ordered and pending.    She also had elevated TSH with TPO antibodies while in hospital; US showed thyroid nodule with recommendation for biopsy. She was started on Levothyroxine 200mcg QD, Cytomel 5mcg QD by Dr Cook/internal med & referral placed to endocrinology. She is approved to see [facility]. This information was provided to her today so she can schedule appointment.     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    History of breast cancer    Other headache syndrome    Stress at home    Idiopathic peripheral neuropathy    Non-suicidal depressed mood    Other specified hypothyroidism    Thyroid nodule    Hypothyroidism due to Hashimoto's thyroiditis    Vision loss of  "right eye    Autoimmune disorder (HCC)    Mixed connective tissue disease (HCC)    Long-term use of hydroxychloroquine       Past Medical History:   Diagnosis Date    Breast cancer (HCC)     Breath shortness     Cancer ([facility]) 2011    breast        Current Outpatient Medications Ordered in Epic   Medication Sig Dispense Refill    hydroxychloroquine (PLAQUENIL) 200 MG Tab Take 1 Tablet by mouth every day. 90 Tablet 3    levothyroxine (SYNTHROID) 200 MCG Tab Take 1 Tablet by mouth every morning on an empty stomach. 30 Tablet 0    liothyronine (CYTOMEL) 5 MCG Tab Take 1 Tablet by mouth every morning before breakfast. 30 Tablet 0     No current Epic-ordered facility-administered medications on file.        Past Surgical History:   Procedure Laterality Date    LUMPECTOMY  09/02/2011    Performed by DOREEN GARCIA at SURGERY SAME DAY [facility] ORS    NODE BIOPSY SENTINEL  09/02/2011    Performed by DOREEN GARCIA at SURGERY SAME DAY [facility]    EYE SURGERY          Allergies:  Bloodless    Health Maintenance: deferred per patient request    ROS:   Review of Systems   Constitutional: Negative.  Negative for fever and malaise/fatigue.   HENT: Negative.     Eyes:  Positive for blurred vision.        Right eye blurred vision   Respiratory:  Negative for cough, sputum production and shortness of breath.    Cardiovascular:  Negative for chest pain, palpitations and leg swelling.   Gastrointestinal: Negative.    Genitourinary: Negative.    Musculoskeletal:  Positive for joint pain.        Generalized joint aches   Neurological: Negative.    Endo/Heme/Allergies: Negative.    Psychiatric/Behavioral: Negative.         Objective:     Exam: BP (!) 142/92   Pulse 97   Temp 37.9 °C (100.2 °F) (Tympanic)   Resp 16   Ht 1.6 m (5' 3\")   Wt 64.4 kg (142 lb)   SpO2 96%  Body mass index is 25.15 kg/m².    Physical Exam  Constitutional:       Appearance: Normal appearance.   Cardiovascular:      Rate and Rhythm: Normal rate and " regular rhythm.      Pulses: Normal pulses.      Heart sounds: Normal heart sounds.   Pulmonary:      Effort: Pulmonary effort is normal.      Breath sounds: Normal breath sounds.   Musculoskeletal:         General: Normal range of motion.      Cervical back: Normal range of motion and neck supple.      Right lower leg: No edema.      Left lower leg: No edema.   Skin:     General: Skin is warm and dry.   Neurological:      General: No focal deficit present.      Mental Status: She is alert and oriented to person, place, and time.   Psychiatric:         Mood and Affect: Mood normal. Affect is tearful.         Behavior: Behavior normal.         Thought Content: Thought content normal.         Judgment: Judgment normal.     Labs: reviewed    Assessment & Plan:   67 y.o. female with the following -  1. Encounter to establish care  Establishing care today. Newly diagnosed with optic neuritis (right eye), MCTD, hypothyroidism. Prior hx of right breast cancer in 2011 s/p right partial mastectomy, chemo, radiation. She is tearful today, 'feels like my whole body is falling apart all of a sudden'. She has established with Dr Pantoja/neuro-opth & Dr Tellez/Rheum. She has scheduled appt to also see neurology in 4/2023. Her referral to endocrinology is approved, she was not aware of this and info given to her today. She has picked up Levothyroxine & Cytomel meds and is taking them daily. Also taking prescribed plaquenil. She is behind on her preventative screens (mammogram, colonoscopy, PAP), I talked to her today about this. She feels overwhelmed with recent illness and wants to hold off on getting these done right now. I will review again with her at our next visit and plan accordingly when she is ready.      2. Optic neuritis  Established with Neuro-Ophthalmology/Dr Pantoja; completed 3 day IV steroids 1/10/2023; vision improved, blurry in right eye; left eye okay. Recently completed tapered high dose prednisone 60mg  --> 20mg over 3 weeks.   - next appt with Dr aPntoja 2/1/2023  - new appt with Dr Escobar/neurology on 4/24/2023      3. Autoimmune disorder (HCC)  4. Mixed connective tissue disease (HCC)  Established with Rheumatology/Dr Tellez; currently on Plaquenil 200mg QD  - next appt with Dr Tellez 4/21/2023  - she has follow up lab orders to be done prior to next visit    5. Hypothyroidism due to Hashimoto's thyroiditis   Latest Reference Range & Units 12/21/22 10:08 01/04/23 17:31 01/05/23 09:17 01/06/23 09:17 01/07/23 07:27   TSH 0.380 - 5.330 uIU/mL  123.000 (H)      Thyroxine -T4 4.50 - 11.70 ug/dL 0.86 (L)       Free T-4 0.93 - 1.70 ng/dL  <0.11 (L) <0.11 (L)  0.48 (L)   Thyroxine Binding Globulin 13.0 - 30.0 ug/mL    28.2    T-Uptake 28 - 41 % 24 (L)       T3,Free 2.00 - 4.40 pg/mL   0.70 (L)  0.96 (L)   Free Thyroxin Index -Fti 1.7 - 4.2 units 0.2 (L)         - Levothyroxine 200mcg QD, Cytomel 5mcg QD; she picked up prescription this week and has been taking daily.   - endocrinology referral approved. She will call [facility] to schedule.  - check TSH, FT4/3, vit D, A1C, lipid profile in 2-3 weeks    - TSH; Future  - FREE THYROXINE; Future  - T3 FREE; Future    6. Thyroid nodule  IMPRESSION:   1/6/2023  1.  1.6 cm right lower thyroid nodule meets criteria for FNA.  2.  1.4 cm left upper thyroid nodule meets criteria for follow-up ultrasound. TR4 (1-1.4cm) - follow up ultrasound in 1,2,3 and 5 years    - referral to endocrinology for biopsy approved and patient to schedule    7. History of breast cancer  8/2011: stage II right breast cancer, ER/OK positive, HER-2/ava negative. S/p right upper outer lumpectomy/partial mastectomy with Dr Peña 9/2/2011. She then underwent adjuvant TC-based chemotherapy following by adjuvant radiation therapy to a total of 61 Gy ending on 03/19/2012. Left breast calcification was also biopsied in 8/2011 and was benign. Last mammogram in 7/2014 showed scattered  fibroglandular density with no new masses or evidence of malignancy;   she declined repeat surveillance mammogram at this time. She will let me know when ready so we can get this done.     8. Screening for metabolic disorder  - HEMOGLOBIN A1C; Future  - Lipid Profile; Future  - VITAMIN D,25 HYDROXY (DEFICIENCY); Future  - CBC WITH DIFFERENTIAL; Future      Educated in proper administration of medication(s) ordered today including safety, possible SE, risks, benefits, rationale and alternatives to therapy.     Return in about 3 months (around 4/26/2023) for hypothyroid, lab review, PAP.    Please note that this dictation was created using voice recognition software. I have made every reasonable attempt to correct obvious errors, but I expect that there are errors of grammar and possibly content that I did not discover before finalizing the note.

## 2023-01-28 ASSESSMENT — ENCOUNTER SYMPTOMS
NEUROLOGICAL NEGATIVE: 1
GASTROINTESTINAL NEGATIVE: 1
BLURRED VISION: 1
FEVER: 0
SHORTNESS OF BREATH: 0
SPUTUM PRODUCTION: 0
PSYCHIATRIC NEGATIVE: 1
CONSTITUTIONAL NEGATIVE: 1
PALPITATIONS: 0
COUGH: 0

## 2023-02-01 ENCOUNTER — OFFICE VISIT (OUTPATIENT)
Dept: OPHTHALMOLOGY | Facility: MEDICAL CENTER | Age: 68
End: 2023-02-01
Payer: MEDICARE

## 2023-02-01 DIAGNOSIS — E04.1 THYROID NODULE: ICD-10-CM

## 2023-02-01 DIAGNOSIS — M35.1 MIXED CONNECTIVE TISSUE DISEASE (HCC): ICD-10-CM

## 2023-02-01 DIAGNOSIS — E03.8 HYPOTHYROIDISM DUE TO HASHIMOTO'S THYROIDITIS: ICD-10-CM

## 2023-02-01 DIAGNOSIS — H46.9 OPTIC NEURITIS: ICD-10-CM

## 2023-02-01 DIAGNOSIS — E06.3 HYPOTHYROIDISM DUE TO HASHIMOTO'S THYROIDITIS: ICD-10-CM

## 2023-02-01 DIAGNOSIS — Z79.899 LONG-TERM USE OF HYDROXYCHLOROQUINE: ICD-10-CM

## 2023-02-01 PROCEDURE — 92250 FUNDUS PHOTOGRAPHY W/I&R: CPT | Performed by: OPHTHALMOLOGY

## 2023-02-01 PROCEDURE — 99215 OFFICE O/P EST HI 40 MIN: CPT | Mod: 25 | Performed by: OPHTHALMOLOGY

## 2023-02-01 ASSESSMENT — CONF VISUAL FIELD
OD_SUPERIOR_NASAL_RESTRICTION: 1
OD_INFERIOR_TEMPORAL_RESTRICTION: 1
OD_SUPERIOR_TEMPORAL_RESTRICTION: 1
OD_INFERIOR_NASAL_RESTRICTION: 1

## 2023-02-01 ASSESSMENT — VISUAL ACUITY
CORRECTION_TYPE: GLASSES
OS_SC: 20/25
OD_SC: 20/CF FACE
METHOD: SNELLEN - LINEAR

## 2023-02-01 ASSESSMENT — SLIT LAMP EXAM - LIDS
COMMENTS: NORMAL
COMMENTS: NORMAL

## 2023-02-01 ASSESSMENT — REFRACTION_MANIFEST
OS_SPHERE: +0.50
OD_SPHERE: +0.25
OD_AXIS: 039
OS_AXIS: 150
OD_CYLINDER: +0.25
OS_CYLINDER: +0.75
METHOD_AUTOREFRACTION: 1

## 2023-02-01 ASSESSMENT — CUP TO DISC RATIO: OD_RATIO: 0.2

## 2023-02-01 ASSESSMENT — REFRACTION_WEARINGRX
OS_CYLINDER: SPHERE
SPECS_TYPE: SVL
OD_CYLINDER: SPHERE
OD_SPHERE: +2.00
OS_SPHERE: +2.00

## 2023-02-01 ASSESSMENT — EXTERNAL EXAM - LEFT EYE: OS_EXAM: NORMAL

## 2023-02-01 ASSESSMENT — TONOMETRY
OS_IOP_MMHG: 17
OD_IOP_MMHG: 14

## 2023-02-01 ASSESSMENT — EXTERNAL EXAM - RIGHT EYE: OD_EXAM: NORMAL

## 2023-02-01 NOTE — ASSESSMENT & PLAN NOTE
1/10/2023-and her further work-up she was found to have an extremely elevated antinuclear antibody.  Her Roth antibody was also somewhat elevated as well as anticardiolipin antibodies.  We will obtain a double-stranded DNA refer to rheumatology help better characterize her autoimmune process and also possibly to make further recommendations for treatments in association with her NMO.  2/1/2023 - diagnosed with MCTD and placed on plaquinel

## 2023-02-01 NOTE — ASSESSMENT & PLAN NOTE
12/21/2022-referred by [facility] for acute vision loss in the right November 5.  She was then seen at [facility] as well by Dr. 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  However he did not notice any disc edema.  She did undergo an MRI scan that was unfortunately done without contrast that demonstrated some optic nerve sheath dilation.  On examination today her visual acuity is light perception in the right eye and there is a right a fair pupillary defect.  An OCT of the optic nerve thickness is normal at 89 in the right and 72 in the left with normal ganglion cell on the right.  Therefore this history is not consistent with a nonarteritic ischemic optic neuropathy and that went over the expected continued swelling noted on dilated examination 10 days later.  This could easily be consistent with a an acute central retinal artery occlusion.  Trevor York did a fluorescein angiogram but I do not have any results to that effect.  However given the optic nerve sheath dilation on MRI scan this could be consistent with a retrobulbar optic neuritis.  However the MRI scan was done without contrast so one does not know where the optic nerve would enhance.  I am therefore we are ordering an MRI scan with gadolinium as well as ordering routine blood work for which she has not done in a while.  Also to obtain further serological testing as well as CT angiogram of the head and neck.  Sending a note back to Dr. York insofar as if he never did a fluorescein angiogram this might help in securing a diagnosis.  1/3/2022 - Review of MRI and labs thus far. MRI demonstrates enhancement to the right optic nerve. MOG - negative, but aquaporin 4 -0 elevated. ACE normal but FELIX extremely high at 1:2560. T4, T3 and Free T4 all low (no TSH). Thus discussed at length with patient. Since still enhancement of the optic nerve would recommend admission to [facility]  for at least 3 days of high dose IV solumedrol 1 gm per day. Needs TSH, connective tissue disease profile, lupus anticoagulant, anti phospholipid antibody, (Rheumatology Consult) PT, PTT, MRI cervical, thoracis and lumbar spine with contrast looking for other enhancing lesion, Lumbar puncture with cytology (history of breast CA), oligoclonal bands and myelin basic protein (Neurologiy consult). If no improvement in the vision on the IV solumedrol and work up pointing to a demyelinating disease then should consider IVIG or PLEX. Discussed with patient. Is getting CTA head and neck tomoroow late morning so recommended that after she present to the [facility] emergency room to initiate work up and get admitted.  1/10/2023-was admitted to [facility] for 3 days of IV steroids.  Also further work-up.  Following the steroids her vision has improved to counting fingers.  Her work-up is extensive and did not reveal any enhancing lesions involving her cervical thoracic or lumbar spine.  However her aquaporin 4 antibody was positive.  Myelin basic protein as well as oligoclonal bands were negative but her CSF protein was 69.  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  I restarted prednisone at 60 mg a day to taper by 20 mg over 3 weeks.  I would like to get her seen by neuro immunology to determine further treatments.  2/1/2023 - Has yet to see neuroimmunology. Saw Rheumatology who diagnosed MCTD and placed on Plaquenil. Given that aquaporin 4 b positive and the retrobulbar optic neuritis and elevated CSF protein would like to also have her se neuroimmunology to determine if they think other treatment would be required.  Of note she may be developing slight atrophy involving the right optic nerve head.  Her OCT is slightly down to 73 OD OS stable at 76 but her optic nerve is very anomalous on that side.

## 2023-02-01 NOTE — PROGRESS NOTES
Peds/Neuro Ophthalmology:   John Pantoja M.D.    Date & Time note created:    2/1/2023   4:28 PM     Referring MD / APRN:  Nisa Lloyd D.N.P., No att. providers found    Patient ID:  Name:             Roxy Sandhu     YOB: 1955  Age:                 67 y.o.  female   MRN:               [id number]    Chief Complaint/Reason for Visit:     Other (Optic neuritis right eye)      History of Present Illness:    Roxy Sandhu is a 67 y.o. female   Follow up optic neuritis right eye with unchanged vision.Some eyelid crusting when using eye drops.      Review of Systems:  ROS    Past Medical History:   Past Medical History:   Diagnosis Date    Breast cancer (HCC)     Breath shortness     Cancer (HCC) 2011    breast       Past Surgical History:  Past Surgical History:   Procedure Laterality Date    LUMPECTOMY  09/02/2011    Performed by DOREEN GARCIA at SURGERY SAME DAY [facility]    NODE BIOPSY SENTINEL  09/02/2011    Performed by DOREEN GARCIA at SURGERY SAME DAY [facility]    EYE SURGERY         Current Outpatient Medications:  Current Outpatient Medications   Medication Sig Dispense Refill    hydroxychloroquine (PLAQUENIL) 200 MG Tab Take 1 Tablet by mouth every day. 90 Tablet 3    levothyroxine (SYNTHROID) 200 MCG Tab Take 1 Tablet by mouth every morning on an empty stomach. 30 Tablet 0    liothyronine (CYTOMEL) 5 MCG Tab Take 1 Tablet by mouth every morning before breakfast. 30 Tablet 0     No current facility-administered medications for this visit.       Allergies:  Allergies   Allergen Reactions    Bloodless        Family History:  Family History   Problem Relation Age of Onset    Diabetes Mother        Social History:  Social History     Socioeconomic History    Marital status: Single     Spouse name: Not on file    Number of children: Not on file    Years of education: Not on file    Highest education level: Not on file   Occupational History    Not on file   Tobacco Use     Smoking status: Never    Smokeless tobacco: Never   Vaping Use    Vaping Use: Never used   Substance and Sexual Activity    Alcohol use: Not Currently     Comment: Occasional     Drug use: Not Currently     Types: Marijuana    Sexual activity: Not on file   Other Topics Concern    Not on file   Social History Narrative    disabled     Social Determinants of Health     Financial Resource Strain: Not on file   Food Insecurity: Not on file   Transportation Needs: Not on file   Physical Activity: Not on file   Stress: Not on file   Social Connections: Not on file   Intimate Partner Violence: Not on file   Housing Stability: Not on file          Physical Exam:  Physical Exam    Oriented x 3  Weight/BMI: There is no height or weight on file to calculate BMI.  There were no vitals taken for this visit.    Base Eye Exam       Visual Acuity (Snellen - Linear)         Right Left    Dist sc 20/CF face 20/25      Correction: Glasses              Tonometry (i care, 3:00 PM)         Right Left    Pressure 14 17              Pupils         APD    Right +3    Left               Visual Fields         Right Left                                Extraocular Movement         Right Left     Full, Ortho Full, Ortho              Neuro/Psych       Oriented x3: Yes    Mood/Affect: difficult historian              Dilation       Both eyes: able to view without dilation                   Slit Lamp and Fundus Exam       External Exam         Right Left    External Normal Normal              Slit Lamp Exam         Right Left    Lids/Lashes Normal Normal    Conjunctiva/Sclera White and quiet White and quiet    Cornea Clear Clear    Anterior Chamber Deep and quiet Deep and quiet    Iris Round and reactive Round and reactive    Lens Clear Posterior chamber intraocular lens    Vitreous Normal Normal              Fundus Exam         Right Left    Disc Normal Tilted, with chichi pap staphyloma    C/D Ratio 0.2     Macula Normal RPE degeneration     Vessels Normal Normal    Periphery Normal Normal                  Refraction       Wearing Rx         Sphere Cylinder    Right +2.00 Sphere    Left +2.00 Sphere      Type: SVL              Manifest Refraction (Auto)         Sphere Cylinder Axis    Right +0.25 +0.25 039    Left +0.50 +0.75 150                    Pertinent Lab/Test/Imaging Review:      Assessment and Plan:     Optic neuritis  12/21/2022-referred by [facility] for acute vision loss in the right November 5.  She was then seen at [facility] as well by Dr. 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  However he did not notice any disc edema.  She did undergo an MRI scan that was unfortunately done without contrast that demonstrated some optic nerve sheath dilation.  On examination today her visual acuity is light perception in the right eye and there is a right a fair pupillary defect.  An OCT of the optic nerve thickness is normal at 89 in the right and 72 in the left with normal ganglion cell on the right.  Therefore this history is not consistent with a nonarteritic ischemic optic neuropathy and that went over the expected continued swelling noted on dilated examination 10 days later.  This could easily be consistent with a an acute central retinal artery occlusion.  Trevor York did a fluorescein angiogram but I do not have any results to that effect.  However given the optic nerve sheath dilation on MRI scan this could be consistent with a retrobulbar optic neuritis.  However the MRI scan was done without contrast so one does not know where the optic nerve would enhance.  I am therefore we are ordering an MRI scan with gadolinium as well as ordering routine blood work for which she has not done in a while.  Also to obtain further serological testing as well as CT angiogram of the head and neck.  Sending a note back to Dr. York insofar as if he never did a fluorescein  angiogram this might help in securing a diagnosis.  1/3/2022 - Review of MRI and labs thus far. MRI demonstrates enhancement to the right optic nerve. MOG - negative, but aquaporin 4 -0 elevated. ACE normal but FELIX extremely high at 1:2560. T4, T3 and Free T4 all low (no TSH). Thus discussed at length with patient. Since still enhancement of the optic nerve would recommend admission to [facility] for at least 3 days of high dose IV solumedrol 1 gm per day. Needs TSH, connective tissue disease profile, lupus anticoagulant, anti phospholipid antibody, (Rheumatology Consult) PT, PTT, MRI cervical, thoracis and lumbar spine with contrast looking for other enhancing lesion, Lumbar puncture with cytology (history of breast CA), oligoclonal bands and myelin basic protein (Neurologiy consult). If no improvement in the vision on the IV solumedrol and work up pointing to a demyelinating disease then should consider IVIG or PLEX. Discussed with patient. Is getting CTA head and neck tomoroow late morning so recommended that after she present to the [facility] emergency room to initiate work up and get admitted.  1/10/2023-was admitted to [facility] for 3 days of IV steroids.  Also further work-up.  Following the steroids her vision has improved to counting fingers.  Her work-up is extensive and did not reveal any enhancing lesions involving her cervical thoracic or lumbar spine.  However her aquaporin 4 antibody was positive.  Myelin basic protein as well as oligoclonal bands were negative but her CSF protein was 69.  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  I restarted prednisone at 60 mg a day to taper by 20 mg over 3 weeks.  I would like to get her seen by neuro immunology to determine further treatments.  2/1/2023 - Has yet to see neuroimmunology. Saw Rheumatology who diagnosed MCTD and placed on Plaquenil. Given that aquaporin 4 b positive and the retrobulbar optic neuritis and elevated CSF protein would like to also  have her se neuroimmunology to determine if they think other treatment would be required.  Of note she may be developing slight atrophy involving the right optic nerve head.  Her OCT is slightly down to 73 OD OS stable at 76 but her optic nerve is very anomalous on that side.    Mixed connective tissue disease (HCC)  1/10/2023-and her further work-up she was found to have an extremely elevated antinuclear antibody.  Her Roth antibody was also somewhat elevated as well as anticardiolipin antibodies.  We will obtain a double-stranded DNA refer to rheumatology help better characterize her autoimmune process and also possibly to make further recommendations for treatments in association with her NMO.  2/1/2023 - diagnosed with MCTD and placed on plaquinel    Thyroid nodule  1/10/2023-refer to endocrinology for biopsy  2/1/2023 - Video chat with Endocrinology feb 23rd.     Hypothyroidism due to Hashimoto's thyroiditis  1/10/2023-she was found to have extreme hypothyroidism consistent with Hashimoto's.  However thyroid ultrasound also discovered a thyroid nodule that was recommended to biopsy.  Her TPO antibodies are also extremely elevated.  We will therefore refer her to endocrinology for further management of her hypothyroidism as well as to consider thyroid needle biopsy.  She has not picked up her thyroid medications following discharge and I stressed the importance of thyroid replacement.  2/1/2023.  He is on thyroid replacement and has video chat with endocrinology    Long-term use of hydroxychloroquine  2/1/2023-we will need to monitor in future any toxicity involving the macula.    Reviewing notes from endocrinology, reviewed general trend of OCT nerve fiber layer thickness, demonstrating changes to patient, discussing importance of thyroid medication follow-up, instituting referral to neuro immunology, discussing relationship of mixed active tissue disorder and neuromyelitis, counseling patient on importance of  follow-up and continuing medications, generating note in epic    John Pantoja M.D.

## 2023-02-02 NOTE — PROCEDURES
Question developing slight atrophy right, stable nerve left eye small tilted with peripapillary atrophy

## 2023-02-02 NOTE — ASSESSMENT & PLAN NOTE
1/10/2023-she was found to have extreme hypothyroidism consistent with Hashimoto's.  However thyroid ultrasound also discovered a thyroid nodule that was recommended to biopsy.  Her TPO antibodies are also extremely elevated.  We will therefore refer her to endocrinology for further management of her hypothyroidism as well as to consider thyroid needle biopsy.  She has not picked up her thyroid medications following discharge and I stressed the importance of thyroid replacement.  2/1/2023.  He is on thyroid replacement and has video chat with endocrinology

## 2023-02-03 ENCOUNTER — TELEPHONE (OUTPATIENT)
Dept: NEUROLOGY | Facility: MEDICAL CENTER | Age: 68
End: 2023-02-03
Payer: MEDICARE

## 2023-02-03 NOTE — TELEPHONE ENCOUNTER
NEUROLOGY PATIENT PRE-VISIT PLANNING     Patient was NOT contacted to complete PVP.  Note: Patient will not be contacted if there is no indication to call.     Patient Appointment is scheduled as: New patient   Is visit type and length scheduled correctly? Yes    [employer] Patient is checked in Patient Demographics? Yes    3.   Is referral attached to visit? Yes    4. Were records received from referring provider? Yes    4. Patient was NOT contacted to have someone accompany them to visit.     5. Is this appointment scheduled as a Hospital Follow-Up?  No    6. Does the patient require any pre procedure or post procedure follow up? No    7. If any orders were placed at last visit or intended to be done for this visit do we have Results/Consult Notes? No  Labs - Labs were not ordered at last office visit.  Imaging - Imaging was not ordered at last office visit.  Referrals - No referrals were ordered at last office visit.  Note: If patient appointment is for lab or imaging review and patient did not complete the studies, check with provider if OK to reschedule patient until completed.    8. If patient appointment is for Botox - is order pended for provider? N/A

## 2023-02-06 RX ORDER — PREDNISONE 20 MG/1
60 TABLET ORAL DAILY
Qty: 90 TABLET | Refills: 0 | Status: SHIPPED | OUTPATIENT
Start: 2023-02-06 | End: 2023-02-13

## 2023-02-06 RX ORDER — LEVOTHYROXINE SODIUM 0.2 MG/1
TABLET ORAL
Qty: 30 TABLET | Refills: 0 | Status: SHIPPED | OUTPATIENT
Start: 2023-02-06 | End: 2023-03-10

## 2023-02-08 ENCOUNTER — OFFICE VISIT (OUTPATIENT)
Dept: NEUROLOGY | Facility: MEDICAL CENTER | Age: 68
End: 2023-02-08
Attending: PSYCHIATRY & NEUROLOGY
Payer: MEDICARE

## 2023-02-08 ENCOUNTER — HOSPITAL ENCOUNTER (OUTPATIENT)
Dept: LAB | Facility: MEDICAL CENTER | Age: 68
End: 2023-02-08
Attending: PSYCHIATRY & NEUROLOGY
Payer: MEDICARE

## 2023-02-08 VITALS
HEART RATE: 85 BPM | TEMPERATURE: 99 F | DIASTOLIC BLOOD PRESSURE: 78 MMHG | RESPIRATION RATE: 17 BRPM | SYSTOLIC BLOOD PRESSURE: 126 MMHG | OXYGEN SATURATION: 97 % | WEIGHT: 146.83 LBS | BODY MASS INDEX: 26.01 KG/M2

## 2023-02-08 DIAGNOSIS — H46.9 OPTIC NEURITIS, RIGHT: ICD-10-CM

## 2023-02-08 DIAGNOSIS — H46.9 OPTIC NEURITIS, RIGHT: Primary | ICD-10-CM

## 2023-02-08 LAB
ALBUMIN SERPL BCP-MCNC: 3.9 G/DL (ref 3.2–4.9)
ALBUMIN/GLOB SERPL: 1.2 G/DL
ALP SERPL-CCNC: 81 U/L (ref 30–99)
ALT SERPL-CCNC: 10 U/L (ref 2–50)
ANION GAP SERPL CALC-SCNC: 13 MMOL/L (ref 7–16)
AST SERPL-CCNC: 18 U/L (ref 12–45)
BASOPHILS # BLD AUTO: 0.1 % (ref 0–1.8)
BASOPHILS # BLD: 0.01 K/UL (ref 0–0.12)
BILIRUB CONJ SERPL-MCNC: <0.2 MG/DL (ref 0.1–0.5)
BILIRUB INDIRECT SERPL-MCNC: NORMAL MG/DL (ref 0–1)
BILIRUB SERPL-MCNC: 0.5 MG/DL (ref 0.1–1.5)
BUN SERPL-MCNC: 11 MG/DL (ref 8–22)
CALCIUM ALBUM COR SERPL-MCNC: 9.5 MG/DL (ref 8.5–10.5)
CALCIUM SERPL-MCNC: 9.4 MG/DL (ref 8.5–10.5)
CHLORIDE SERPL-SCNC: 104 MMOL/L (ref 96–112)
CO2 SERPL-SCNC: 22 MMOL/L (ref 20–33)
CREAT SERPL-MCNC: 0.68 MG/DL (ref 0.5–1.4)
EOSINOPHIL # BLD AUTO: 0.02 K/UL (ref 0–0.51)
EOSINOPHIL NFR BLD: 0.3 % (ref 0–6.9)
ERYTHROCYTE [DISTWIDTH] IN BLOOD BY AUTOMATED COUNT: 48.5 FL (ref 35.9–50)
GFR SERPLBLD CREATININE-BSD FMLA CKD-EPI: 96 ML/MIN/1.73 M 2
GLOBULIN SER CALC-MCNC: 3.3 G/DL (ref 1.9–3.5)
GLUCOSE SERPL-MCNC: 87 MG/DL (ref 65–99)
HCT VFR BLD AUTO: 36.1 % (ref 37–47)
HCV AB SER QL: NORMAL
HGB BLD-MCNC: 11.4 G/DL (ref 12–16)
IMM GRANULOCYTES # BLD AUTO: 0.03 K/UL (ref 0–0.11)
IMM GRANULOCYTES NFR BLD AUTO: 0.4 % (ref 0–0.9)
LYMPHOCYTES # BLD AUTO: 1.18 K/UL (ref 1–4.8)
LYMPHOCYTES NFR BLD: 16.4 % (ref 22–41)
MCH RBC QN AUTO: 31.3 PG (ref 27–33)
MCHC RBC AUTO-ENTMCNC: 31.6 G/DL (ref 33.6–35)
MCV RBC AUTO: 99.2 FL (ref 81.4–97.8)
MONOCYTES # BLD AUTO: 1.03 K/UL (ref 0–0.85)
MONOCYTES NFR BLD AUTO: 14.3 % (ref 0–13.4)
NEUTROPHILS # BLD AUTO: 4.92 K/UL (ref 2–7.15)
NEUTROPHILS NFR BLD: 68.5 % (ref 44–72)
NRBC # BLD AUTO: 0 K/UL
NRBC BLD-RTO: 0 /100 WBC
PLATELET # BLD AUTO: 311 K/UL (ref 164–446)
PMV BLD AUTO: 9.8 FL (ref 9–12.9)
POTASSIUM SERPL-SCNC: 4 MMOL/L (ref 3.6–5.5)
PROT SERPL-MCNC: 7.2 G/DL (ref 6–8.2)
RBC # BLD AUTO: 3.64 M/UL (ref 4.2–5.4)
SODIUM SERPL-SCNC: 139 MMOL/L (ref 135–145)
WBC # BLD AUTO: 7.2 K/UL (ref 4.8–10.8)

## 2023-02-08 PROCEDURE — 82784 ASSAY IGA/IGD/IGG/IGM EACH: CPT

## 2023-02-08 PROCEDURE — 86480 TB TEST CELL IMMUN MEASURE: CPT

## 2023-02-08 PROCEDURE — G2212 PROLONG OUTPT/OFFICE VIS: HCPCS | Performed by: PSYCHIATRY & NEUROLOGY

## 2023-02-08 PROCEDURE — 85025 COMPLETE CBC W/AUTO DIFF WBC: CPT

## 2023-02-08 PROCEDURE — 82248 BILIRUBIN DIRECT: CPT

## 2023-02-08 PROCEDURE — 99215 OFFICE O/P EST HI 40 MIN: CPT | Performed by: PSYCHIATRY & NEUROLOGY

## 2023-02-08 PROCEDURE — 80053 COMPREHEN METABOLIC PANEL: CPT

## 2023-02-08 PROCEDURE — 36415 COLL VENOUS BLD VENIPUNCTURE: CPT

## 2023-02-08 PROCEDURE — 86803 HEPATITIS C AB TEST: CPT

## 2023-02-08 PROCEDURE — 86256 FLUORESCENT ANTIBODY TITER: CPT | Mod: XU

## 2023-02-08 PROCEDURE — 86362 MOG-IGG1 ANTB CBA EACH: CPT

## 2023-02-08 PROCEDURE — 99211 OFF/OP EST MAY X REQ PHY/QHP: CPT | Performed by: PSYCHIATRY & NEUROLOGY

## 2023-02-08 PROCEDURE — 86787 VARICELLA-ZOSTER ANTIBODY: CPT

## 2023-02-08 PROCEDURE — 86052 AQUAPORIN-4 ANTB CBA EACH: CPT

## 2023-02-08 ASSESSMENT — FIBROSIS 4 INDEX: FIB4 SCORE: 2.121320343559642573

## 2023-02-08 NOTE — PROGRESS NOTES
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  Diagnosis of MS: 12/21/2022  Disease History:  - 11/5/2022: acute-onset right monocular blindness w/o associated pain  - 1/10/2023: admitted at [facility]; treated with IVMP x3 days, minimal improvement  Disease course at onset: n/a  Current disease course: n/a  Previous disease therapies:  - none  Current disease therapies:  - none  Symptomatic therapies:  - none  CSF (1/5/2023):  - OCBs: 0  Other Testing:  - anti-AQP4 Ab (12/21/2022): 28.2  - anti-MOG Ab (12/21/2022): <1:10  MRI head:  - no dedicated brain scans to date  MRI orbits:  - 12/22/2022: \"abnormal enhancement of the retro-orbital portion of the right optic nerve consistent with acute optic neuritis...\"  - 12/6/2022: \"focal increased T1 signal intensity in the right retro-orbital portion of the right optic nerve...\" (contrast not administered)  MRI cervical spine:  - 1/4/2023: no visible lesions  MRI thoracic spine:  - 1/4/2023: no visible lesions  Immunizations:  - influenza?:   - Pneumonia?:  - SARS-CoV-2?:   Cancer Screens:  - mammogram:   - PAP?:   - skin check:     CC: possible NMOSD    HISTORY OF ILLNESS:  Roxy Sandhu is a 67 y.o. woman with a history most notable for optic neuritis (right), hypothyroidism, macular degeneration (age-related), breast cancer, and MCTD (mixed connective tissue disease).  Today, she was accompanied by her common-law , and she provided the following history:    11/5/2022:  Between 16:00-17:00 Roxy noticed changes in the vision in the right eye.  Within seconds the vision became \"dark.\"  There was no associated pain.    1/10/2023:  Roxy was treated with IVMP x3 days.    Dr. Pantoja prescribed a prednisone taper beginning at 60 mg/day; however, she elected to discontinue this.    Roxy does not recall " any previous episodes of neurologic symptoms.    MEDICAL AND SURGICAL HISTORY:  Past Medical History:   Diagnosis Date    Breast cancer (HCC)     Breath shortness     Cancer (HCC) 2011    breast     Past Surgical History:   Procedure Laterality Date    LUMPECTOMY  09/02/2011    Performed by DOREEN GARCIA at SURGERY SAME DAY [facility]    NODE BIOPSY SENTINEL  09/02/2011    Performed by DOREEN GARCIA at SURGERY SAME DAY [facility]    EYE SURGERY       MEDICATIONS:  Current Outpatient Medications   Medication Sig    levothyroxine (SYNTHROID) 200 MCG Tab TAKE 1 TABLET BY MOUTH EVERY DAY IN THE MORNING ON AN EMPTY STOMACH    predniSONE (DELTASONE) 20 MG Tab TAKE 3 TABLETS BY MOUTH EVERY DAY FOR 7 DAYS. AFTER 7 DAYS DECREASE TO 40 MG PER DAY (2 TABS) THEN AFTER 7 DAYS DECREASE TO 20 MG PER DAY ( ONE TABLET) MAINTAIN ON 20 MG (ONE TABLE UNTIL SEEN AGAIN BY MD)    hydroxychloroquine (PLAQUENIL) 200 MG Tab Take 1 Tablet by mouth every day.    liothyronine (CYTOMEL) 5 MCG Tab Take 1 Tablet by mouth every morning before breakfast.     SOCIAL HISTORY:  Social History     Tobacco Use    Smoking status: Never    Smokeless tobacco: Never   Substance Use Topics    Alcohol use: Not Currently     Comment: Occasional      Social History     Social History Narrative    disabled     FAMILY HISTORY:  Family History   Problem Relation Age of Onset    Diabetes Mother      REVIEW OF SYSTEMS:  A ROS was completed.  Pertinent positives and negatives were included in the HPI, above.  All other systems were reviewed and are negative.    PHYSICAL EXAM:  General/Medical:  - NAD  - hair, skin, nails, and joints were normal    Neuro:  MENTAL STATUS: awake and alert; no deficits of speech or language; oriented to conversation; tearful through much of the encounter    CRANIAL NERVES:    II: acuity: movement/J16 without glasses, fields: intact to confrontation, RAPD on the right    III/IV/VI: versions: grossly intact    V: facial sensation:  symmetric to light touch    VII: facial expression: symmetric    VIII: hearing: intact to voice    IX/X: palate: elevates symmetrically    XI: shoulder shrug: symmetric    XII: tongue: midline    MOTOR:  - bulk: normal throughout  - tone: NT  - functional strength: grossly normal throughout  Upper Extremity Strength  (R/L)    NT   Elbow flexion NT   Elbow extension NT   Shoulder abduction NT     Lower Extremity Strength  (R/L)   Hip flexion NT   Knee extension NT   Knee flexion NT   Ankle plantarflexion NT   Ankle dorsiflexion NT     - pronator drift: NT  - abnormal movements: none    SENSATION:  - light touch: grossly intact over the upper and lower extremities  - vibration (R/L, seconds): NT/NT at the great toes  - pinprick: NT  - proprioception: NT  - Romberg: absent    COORDINATION:  - finger to nose: NT  - finger tapping: NT    REFLEXES:  Reflex Right Left   BR 2+ 2+   Biceps 2+ 2+   Triceps 2+ 2+   Patellae 2+ 2+   Achilles NT NT   Toes NT NT     GAIT:  - narrow base and normal  - heel-raised/toe-raised gait: NT/NT  - tandem gait: NT    QUANTITATIVE SCORES:  Timed 25-foot walk (sec): NT.  Assistive device: none    REVIEW OF IMAGING STUDIES:  I have summarized pertinent data above.    REVIEW OF LABORATORY STUDIES:  I have summarized pertinent data above.    ASSESSMENT:  Roxy Sandhu is a 67 y.o. 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  Roxy likely fulfills criteria for NMOSD.  I have re-ordered the anti-AQP4 antibody test to ensure the cell-based assay is performed.  I have also ordered a standard set of pre-treatment labs in order to gauge safety prior to considering immunotherapies.  She stopped her steroid taper prematurely.  There is no clear history that her vision worsened, so I did not press her on this issue.  We will  "follow up in a few weeks once the lab results are available.    PLAN:  NMOSD (anti-AQP4 Ab-positive)  Orders Placed This Encounter    CNS DEMYELINATING DISEASE RFLX PANEL    LFTs    CBC with differential    complete metabolic panel    hepatitis B surface Ab    hepatitis b surface antigen    hepatitis C antibody    IgA    IgG    IgM    lymphocyte subsets (B & T cells)    quantiferon gold plus TB (4 tube)    varicella zoster IgG Ab     Follow-Up:  - Return in about 4 weeks (around 3/8/2023).    Signed: Crescencio Escobar M.D.    BILLING DOCUMENTATION:   I spent 84 minutes reviewing the medical record, interviewing and examining the patient, discussing my impression (see \"assessment\" above), and coordinating care.  "

## 2023-02-10 LAB
GAMMA INTERFERON BACKGROUND BLD IA-ACNC: 0.15 IU/ML
IGA SERPL-MCNC: 320 MG/DL (ref 68–408)
IGG SERPL-MCNC: 1039 MG/DL (ref 768–1632)
IGM SERPL-MCNC: 62 MG/DL (ref 35–263)
M TB IFN-G BLD-IMP: NEGATIVE
M TB IFN-G CD4+ BCKGRND COR BLD-ACNC: -0.07 IU/ML
MITOGEN IGNF BCKGRD COR BLD-ACNC: 8.73 IU/ML
QFT TB2 - NIL TBQ2: -0.1 IU/ML
VZV IGG SER IA-ACNC: 920.3 IV

## 2023-02-14 LAB
AQP4 H2O CHANNEL IGG SERPL QL IF: DETECTED
AQP4 H2O CHANNEL IGG TITR SERPL IF: ABNORMAL {TITER}
MOG AB SER QL CBA IFA: ABNORMAL

## 2023-03-08 ENCOUNTER — APPOINTMENT (OUTPATIENT)
Dept: NEUROLOGY | Facility: MEDICAL CENTER | Age: 68
End: 2023-03-08
Attending: PSYCHIATRY & NEUROLOGY
Payer: MEDICARE

## 2023-03-10 ENCOUNTER — OFFICE VISIT (OUTPATIENT)
Dept: NEUROLOGY | Facility: MEDICAL CENTER | Age: 68
End: 2023-03-10
Attending: PSYCHIATRY & NEUROLOGY
Payer: MEDICARE

## 2023-03-10 VITALS
BODY MASS INDEX: 26.95 KG/M2 | SYSTOLIC BLOOD PRESSURE: 126 MMHG | OXYGEN SATURATION: 100 % | DIASTOLIC BLOOD PRESSURE: 84 MMHG | HEART RATE: 75 BPM | WEIGHT: 152.12 LBS | TEMPERATURE: 97.6 F

## 2023-03-10 DIAGNOSIS — E03.8 HYPOTHYROIDISM DUE TO HASHIMOTO'S THYROIDITIS: Primary | ICD-10-CM

## 2023-03-10 DIAGNOSIS — E06.3 HYPOTHYROIDISM DUE TO HASHIMOTO'S THYROIDITIS: Primary | ICD-10-CM

## 2023-03-10 PROCEDURE — 99211 OFF/OP EST MAY X REQ PHY/QHP: CPT | Performed by: PSYCHIATRY & NEUROLOGY

## 2023-03-10 PROCEDURE — 99215 OFFICE O/P EST HI 40 MIN: CPT | Performed by: PSYCHIATRY & NEUROLOGY

## 2023-03-10 RX ORDER — LEVOTHYROXINE SODIUM 0.2 MG/1
200 TABLET ORAL
Qty: 30 TABLET | Refills: 3 | Status: SHIPPED | OUTPATIENT
Start: 2023-03-10 | End: 2023-04-09

## 2023-03-10 ASSESSMENT — FIBROSIS 4 INDEX: FIB4 SCORE: 1.19

## 2023-03-10 NOTE — PROGRESS NOTES
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  Diagnosis of MS: 12/21/2022  Disease History:  - 11/5/2022: acute-onset right monocular blindness w/o associated pain  - 1/10/2023: admitted at [facility]; treated with IVMP x3 days, minimal improvement  Disease course at onset: n/a  Current disease course: n/a  Previous disease therapies:  - none  Current disease therapies:  - none  Symptomatic therapies:  - none  CSF (1/5/2023):  - OCBs: 0  Other Testing:  - anti-AQP4 Ab (CBA, 2/8/2023): 1:160  - anti-AQP4 Ab (12/21/2022): 28.2  - anti-MOG Ab (2/8/2023): negative  - anti-MOG Ab (12/21/2022): <1:10  MRI head:  - no dedicated brain scans to date  MRI orbits:  - 12/22/2022: \"abnormal enhancement of the retro-orbital portion of the right optic nerve consistent with acute optic neuritis...\"  - 12/6/2022: \"focal increased T1 signal intensity in the right retro-orbital portion of the right optic nerve...\" (contrast not administered)  MRI cervical spine:  - 1/4/2023: no visible lesions  MRI thoracic spine:  - 1/4/2023: no visible lesions  Immunizations:  - influenza?:   - Pneumonia?:  - SARS-CoV-2?:   Cancer Screens:  - mammogram:   - PAP?:   - skin check:   Immunizations:  - influenza?:   - Pneumonia?:  - SARS-CoV-2?:   Cancer Screens:  - mammogram:   - PAP?:   - skin check:     CC: anti-AQP4 Ab-positive NMO-SD    INTERVAL HISTORY:  Roxy Sandhu is a 67 y.o. woman with anti-AQP4 Ab-positive NMOSD and a history otherwise notable for optic neuritis (right), hypothyroidism, macular degeneration (age-related), breast cancer, and MCTD (mixed connective tissue disease).  I last saw her in the [facility] on 2/8/2023.  At that time I recommended repeat antibody testing as well as routine pre-treatment blood work for various immunotherapies.  Today, she was accompanied by her common-law , and she provided the " following interval history:    Roxy has not noticed any new or worsened neurologic symptoms since the last visit.    MEDICATIONS:  Current Outpatient Medications   Medication Sig    levothyroxine (SYNTHROID) 200 MCG Tab Take 1 Tablet by mouth every morning on an empty stomach for 30 days.     MEDICAL, SOCIAL, AND FAMILY HISTORY:  There is no change in the patient's ROS or medical, social, or family histories since the previous visit on 2/8/2023.    REVIEW OF SYSTEMS:  A ROS was completed.  Pertinent positives and negatives were included in the HPI, above.  All other systems were reviewed and are negative.    PHYSICAL EXAM:  General/Medical:  - NAD    Neuro:  MENTAL STATUS: awake and alert; no deficits of speech or language; oriented to conversation; affect was appropriate to situation; pleasant, cooperative    CRANIAL NERVES:    II: acuity: NT, fields: NT, pupils: NT, discs: NT    III/IV/VI: versions: grossly intact    V: facial sensation: NT    VII: facial expression: symmetric    VIII: hearing: intact to voice    IX/X: palate: NT    XI: shoulder shrug: NT    XII: tongue: NT    MOTOR:  - bulk: NT  - tone: NT  Upper Extremity Strength  (R/L)    NT   Elbow flexion NT   Elbow extension NT   Shoulder abduction NT     Lower Extremity Strength  (R/L)   Hip flexion NT   Knee extension NT   Knee flexion NT   Ankle plantarflexion NT   Ankle dorsiflexion NT     - pronator drift: NT  - abnormal movements: none    SENSATION:  - light touch: NT  - vibration (R/L, seconds): NT at the great toes  - pinprick: NT  - proprioception: NT  - Romberg: absent    COORDINATION:  - finger to nose: NT  - finger tapping: NT    REFLEXES:  Reflex Right Left   BR NT NT   Biceps NT NT   Triceps NT NT   Patellae NT NT   Achilles NT NT   Toes NT NT     GAIT:  - NT    REVIEW OF IMAGING STUDIES:  No additional data since the last visit.    REVIEW OF LABORATORY STUDIES:  I have summarized pertinent data above.    ASSESSMENT:  Roxy Sandhu is  "a 67 y.o. woman with anti-AQP4 Ab-positive NMOSD and a history otherwise notable for optic neuritis (right), hypothyroidism, macular degeneration (age-related), breast cancer, and MCTD (mixed connective tissue disease).  Repeat antibody testing shows persistent anti-AQP4 Ab positivity.  Taken together, Roxy seems to meet criteria for a diagnosis of anti-AQP4 Ab-positive NMO-SD.  I discussed the implications of this with Roxy at length.  In light of the severity of her first attack (and the poor recovery she experienced with steroids), I am very much in favor of initiating immunotherapy in order to reduce the risk of additional attacks.  I have considered the various approved immunotherapy options for this condition, and satralizumab (Enspryng) is the most attractive option.  Roxy was very hesitant to start therapy.  In fact, she stopped taking all of her medications.  I recommended she resume at least levothyroxine.  I explained the risks of remaining off immunotherapy including additional episodes of optic neuritis (and perhaps involvement of the other eye!).  Still, she prefers to remain off therapy for now.  We will follow up in a few months or sooner to continue this discussion.    PLAN:  NMO-SD (anti-AQP4 Ab-positive)  - recommend starting Enspryng (satralizumab)    Hypothyroidism:  - recommended resumption of levothyroxine (provided a short supply until she can re-establish with PCP)    Follow-Up:  - Return in about 3 months (around 6/10/2023).    Signed: Crescencio Escobar M.D.    BILLING DOCUMENTATION:   I spent 63 minutes reviewing the medical record, interviewing and examining the patient, discussing my impression (see \"assessment\" above), and coordinating care.  "

## 2023-04-12 ENCOUNTER — APPOINTMENT (OUTPATIENT)
Dept: OPHTHALMOLOGY | Facility: MEDICAL CENTER | Age: 68
End: 2023-04-12
Payer: MEDICARE

## 2023-04-20 ENCOUNTER — TELEPHONE (OUTPATIENT)
Dept: MEDICAL GROUP | Facility: PHYSICIAN GROUP | Age: 68
End: 2023-04-20
Payer: MEDICARE

## 2023-04-20 NOTE — TELEPHONE ENCOUNTER
Nisa,    I called Roxy to remind her to complete labs before her visit that was scheduled 04/26/23. She wanted to cancel this appt she was feeling overwhelmed with some of her other Doctor appts right now. She wanted you to know she hasn't forgot about the appointment and will schedule when it's more convenient for her.

## 2023-04-20 NOTE — TELEPHONE ENCOUNTER
Future Appointments         Provider Department Center    4/21/2023 1:30 PM (Arrive by 1:15 PM) Michael Tellez M.D. [facility] Rheumatology     4/24/2023 9:20 AM Crescencio Escobar M.D. [facility] Neurology     4/26/2023 9:00 AM (Arrive by 8:45 AM) Nisa Lloyd D.N.P. [facility]    5/9/2023 1:00 PM John Pantoja M.D. [facility] - Ophthalmology     6/29/2023 9:40 AM Crescencio Escobar M.D. [facility] Neurology           ESTABLISHED PATIENT PRE-VISIT PLANNING     Patient was contacted to complete PVP.     Note: Patient will not be contacted if there is no indication to call.     1.  Reviewed notes from the last few office visits within the medical group: Yes, lOV 01/26/2023    2.  If any orders were placed at last visit or intended to be done for this visit (i.e. 6 mos follow-up), do we have Results/Consult Notes?           Labs - Labs ordered, NOT completed. Patient advised to complete prior to next appointment.  Note: If patient appointment is for lab review and patient did not complete labs, check with provider if OK to reschedule patient until labs completed.         Imaging - Imaging was not ordered at last office visit.         Referrals - No referrals were ordered at last office visit.    3. Is this appointment scheduled as a Hospital Follow-Up? No    4.  Immunizations were updated in Epic using Reconcile Outside Information activity? No, Nothing in WebIz    5.  Patient is due for the following Health Maintenance Topics:   Health Maintenance Due   Topic Date Due    BONE DENSITY  Never done    CERVICAL CANCER SCREENING  Never done    Annual Wellness Visit  Never done    COVID-19 Vaccine (1) Never done    IMM DTaP/Tdap/Td Vaccine (1 - Tdap) Never done    COLORECTAL CANCER SCREENING  Never done    IMM ZOSTER VACCINES (1 of 2) Never done    MAMMOGRAM  07/15/2016    IMM PNEUMOCOCCAL VACCINE: 65+ Years (1 - PCV) Never done     6.  AHA (Pulse8) form printed for Provider? N/A   Pt has  several appts currently and wanted to cancel this appointment she will call and schedule when convenient.

## 2023-04-21 ENCOUNTER — HOSPITAL ENCOUNTER (OUTPATIENT)
Dept: LAB | Facility: MEDICAL CENTER | Age: 68
End: 2023-04-21
Attending: STUDENT IN AN ORGANIZED HEALTH CARE EDUCATION/TRAINING PROGRAM
Payer: MEDICARE

## 2023-04-21 ENCOUNTER — OFFICE VISIT (OUTPATIENT)
Dept: RHEUMATOLOGY | Facility: MEDICAL CENTER | Age: 68
End: 2023-04-21
Attending: STUDENT IN AN ORGANIZED HEALTH CARE EDUCATION/TRAINING PROGRAM
Payer: MEDICARE

## 2023-04-21 VITALS
SYSTOLIC BLOOD PRESSURE: 138 MMHG | OXYGEN SATURATION: 95 % | HEIGHT: 63 IN | BODY MASS INDEX: 27.11 KG/M2 | TEMPERATURE: 98.1 F | WEIGHT: 153 LBS | DIASTOLIC BLOOD PRESSURE: 74 MMHG | HEART RATE: 82 BPM

## 2023-04-21 DIAGNOSIS — M35.1 MIXED CONNECTIVE TISSUE DISEASE (HCC): ICD-10-CM

## 2023-04-21 DIAGNOSIS — Z79.899 LONG-TERM USE OF HYDROXYCHLOROQUINE: ICD-10-CM

## 2023-04-21 DIAGNOSIS — G36.0 NEUROMYELITIS OPTICA SPECTRUM DISORDER (HCC): ICD-10-CM

## 2023-04-21 LAB
C3 SERPL-MCNC: 137.8 MG/DL (ref 87–200)
C4 SERPL-MCNC: 24.2 MG/DL (ref 19–52)
CRP SERPL HS-MCNC: 0.68 MG/DL (ref 0–0.75)

## 2023-04-21 PROCEDURE — 86162 COMPLEMENT TOTAL (CH50): CPT

## 2023-04-21 PROCEDURE — 86160 COMPLEMENT ANTIGEN: CPT

## 2023-04-21 PROCEDURE — 36415 COLL VENOUS BLD VENIPUNCTURE: CPT

## 2023-04-21 PROCEDURE — 86146 BETA-2 GLYCOPROTEIN ANTIBODY: CPT

## 2023-04-21 PROCEDURE — 86140 C-REACTIVE PROTEIN: CPT

## 2023-04-21 PROCEDURE — 99211 OFF/OP EST MAY X REQ PHY/QHP: CPT | Performed by: STUDENT IN AN ORGANIZED HEALTH CARE EDUCATION/TRAINING PROGRAM

## 2023-04-21 PROCEDURE — 85652 RBC SED RATE AUTOMATED: CPT

## 2023-04-21 PROCEDURE — 99214 OFFICE O/P EST MOD 30 MIN: CPT | Performed by: STUDENT IN AN ORGANIZED HEALTH CARE EDUCATION/TRAINING PROGRAM

## 2023-04-21 ASSESSMENT — FIBROSIS 4 INDEX: FIB4 SCORE: 1.19

## 2023-04-21 NOTE — PATIENT INSTRUCTIONS
AFTER VISIT INSTRUCTIONS    Below are important information to help you navigate your healthcare needs and help us serve you safely and effectively:  If laboratory tests and/or imaging studies were ordered, remember to go get them done as instructed.  If new prescriptions and/or refills were sent, remember to go pick them up from your local pharmacy, or call the specialty pharmacy to request shipment.  Always take your prescription medications exactly as prescribed unless instructed otherwise.  Note that antirheumatic drugs and steroids are immunosuppressive which means they increase your risk of infections and have multiple potential adverse effects on various organ systems in your body, though most of them are uncommon.  It is important that you are up-to-date on age-appropriate immunizations, particularly shingles and bacterial/viral pneumonia vaccines, which you can request from me or your primary care provider.  Be sure to read the drug package inserts to learn about the potential side effects of your medications before you start taking them.  If you experience any significant drug side effects, stop taking the medication and notify me promptly, and depending on the severity of the side effects, consider going to an urgent care or emergency department for immediate attention.  If there are significant findings on your lab tests and imaging studies that warrant further action, I will notify you with explanations via Electrochaeahart or phone call, otherwise you can view them on Xanofi and let me know if you have any questions.  Note that Xanofi messages are typically read during office hours and may take 1-7 business days before a response depending on the urgency of the situation and how busy my clinic schedule is.  In general, Xanofi messaging is for non-urgent matters that do not require immediate attention, so for urgent matters that cannot wait, you are advised to go to an urgent care.  Lastly, you are granted  MyChart access to my documentation of your visit and are encouraged to read my note which details my assessment and plan for your condition.

## 2023-04-21 NOTE — PROGRESS NOTES
[facility]  [address], [address], [place] [address]  Phone: [phone] ? Fax: [phone]    RHEUMATOLOGY FOLLOW-UP VISIT NOTE      DATE OF SERVICE: 04/21/2023         Subjective     PRIMARY CARE PRACTITIONER:  Nisa Lloyd D.N.P.  [address] [facility] [address]    PATIENT IDENTIFICATION:  Roxy Sandhu  [address] [address]    YOB: 1955    MEDICAL RECORD NUMBER: [id number]          CHIEF COMPLAINT:   Chief Complaint   Patient presents with    Follow-Up     Mixed connective tissue disease (HCC)       RHEUMATOLOGIC HISTORY:  Roxy Sandhu is a 67 y.o. 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 She initially presented on 1/20/23 for rheumatologic evaluation in the setting of positive FELIX and other autoantibodies. Reported chronic gradually progressive waxing/waning course of symptoms including visual disturbances, joint/muscle pain in her hands, knees, and neck/cervical region, variable degrees of morning stiffness, tingling/numbness with burning sensation in hands/feet, and body aches. Experienced some improvement following a course of high-dose prednisone taper prescribed by neuro-ophthalmology.     Pertinent treatment history: Prednisone 60 mg taper (helpful), hydroxychloroquine 200 mg daily (prescribed 1/20/23-present).     Pertinent laboratory results: Strongly positive FELIX >1:2560 speckled pattern with anti-Roth/RNP 55 and anti-dsDNA 53, positive IgM anti-CL 22, positive anti-AQP4R 28.2, strongly positive anti-TPO >600 with low free T4 of 0.48 and high TSH of 123 (in 1/2023); elevated ESR 30 with normal CRP; negative/normal LAC, anti-MOG, MBP, ACE, LFT, and treponema/syphilis (in 1/2023).    INTERVAL HISTORY:  Variable distortions in visual acuity  for which she is seeing ophthalmology and neurology.  No significant cutaneous or musculoskeletal complaints.    REVIEW OF SYSTEMS:  Except as noted in the history above, relevant review of systems with emphasis on autoimmune rheumatic diseases was otherwise negative.      ACTIVE PROBLEM LIST:  Patient Active Problem List    Diagnosis Date Noted    Mixed connective tissue disease (HCC) 01/20/2023    Long-term use of hydroxychloroquine 01/20/2023    Autoimmune disorder (HCC) 01/10/2023    Vision loss of right eye 01/08/2023    Non-suicidal depressed mood 01/07/2023    Other specified hypothyroidism 01/07/2023    Thyroid nodule 01/07/2023    Hypothyroidism due to Hashimoto's thyroiditis 01/07/2023    Other headache syndrome 01/05/2023    Stress at home 01/05/2023    Idiopathic peripheral neuropathy 01/05/2023    History of breast cancer 01/04/2023    Optic neuritis 12/21/2022    Pseudophakia of left eye 12/21/2022    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 12/21/2022       PAST MEDICAL HISTORY:  Past Medical History:   Diagnosis Date    Breast cancer ([facility])     Breath shortness     Cancer ([facility]) 2011    breast       PAST SURGICAL HISTORY:  Past Surgical History:   Procedure Laterality Date    LUMPECTOMY  09/02/2011    Performed by DOREEN GARCIA at SURGERY SAME DAY [facility] ORS    NODE BIOPSY SENTINEL  09/02/2011    Performed by DOREEN GARCIA at SURGERY SAME DAY [facility]    EYE SURGERY         MEDICATIONS:  No current outpatient medications on file.       ALLERGIES:   Allergies   Allergen Reactions    Bloodless        IMMUNIZATIONS:  There is no immunization history on file for this patient.    SOCIAL HISTORY:   Social History     Socioeconomic History    Marital status: Single   Tobacco Use    Smoking status: Never    Smokeless tobacco: Never   Vaping Use    Vaping Use: Never used   Substance and Sexual Activity    Alcohol use: Not Currently     Comment: Occasional     Drug use: Not  "Currently     Types: Marijuana, Inhaled   Social History Narrative    disabled       FAMILY HISTORY:  Family History   Problem Relation Age of Onset    Diabetes Mother             Objective     Vital Signs: /74 (BP Location: Left arm, Patient Position: Sitting, BP Cuff Size: Adult)   Pulse 82   Temp 36.7 °C (98.1 °F) (Temporal)   Ht 1.6 m (5' 3\")   Wt 69.4 kg (153 lb)   SpO2 95% Body mass index is 27.1 kg/m².    General: Appears well and comfortable  Eyes: No scleral or conjunctival lesions  ENT: No apparent oral or nasal lesions  Head/Neck: No apparent scalp or neck lesions  Cardiovascular: Regular rate and rhythm  Respiratory: Breathing quiet and unlabored  Gastrointestinal: No apparent organomegaly or abdominal masses  Integumentary: No significant cutaneous lesions or discolorations  Musculoskeletal: No significant joint tenderness, periarticular soft tissue swelling, warmth, erythema, or overt signs of synovitis; no significant restriction in range of motion of joints examined  Neurologic: No focal sensory or motor deficits  Psychiatric: Mood and affect appropriate      LABORATORY RESULTS REVIEWED AND INTERPRETED BY ME:  Lab Results   Component Value Date/Time    SEDRATEWES 10 04/21/2023 02:44 PM    CREACTPROT 0.68 04/21/2023 02:44 PM     Lab Results   Component Value Date/Time    ANTINUCAB Detected (A) 12/21/2022 10:08 AM     Lab Results   Component Value Date/Time    ANTIDNADS 53 (H) 01/10/2023 12:29 PM    SMITHAB 18 01/05/2023 06:25 PM    RNPAB 55 (H) 01/05/2023 06:25 PM    CENTROMBAB 0 01/05/2023 06:25 PM    GUVRISW94 0 01/05/2023 06:25 PM    SSA60 0 01/05/2023 06:25 PM    SSA52 15 01/05/2023 06:25 PM    ANTISSBSJ 0 01/05/2023 06:25 PM    JO1AB 0 01/05/2023 06:25 PM     Lab Results   Component Value Date/Time    IGACARDIOLI <10 01/04/2023 05:31 PM    IGMCARDIOLI 22 (H) 01/04/2023 05:31 PM    IGGCARDIOLI <10 01/04/2023 05:31 PM    RUSSELVIPER 38.2 01/04/2023 05:31 PM     Lab Results   Component " Value Date/Time     02/08/2023 12:27 PM    IGG 1039 02/08/2023 12:27 PM     Lab Results   Component Value Date/Time    MICROSOMALA >600.0 (H) 01/06/2023 09:17 AM    TSHULTRASEN 123.000 (H) 01/04/2023 05:31 PM    FREET4 0.48 (L) 01/07/2023 07:27 AM     Lab Results   Component Value Date/Time    ACESERUM 39 12/21/2022 10:08 AM     Lab Results   Component Value Date/Time    PROTHROMBTM 13.5 01/04/2023 05:31 PM    PROTHROMBTM 13.8 01/04/2023 05:31 PM    INR 1.04 01/04/2023 05:31 PM    INR 1.07 01/04/2023 05:31 PM     Lab Results   Component Value Date/Time    WBC 7.2 02/08/2023 12:27 PM    RBC 3.64 (L) 02/08/2023 12:27 PM    HEMOGLOBIN 11.4 (L) 02/08/2023 12:27 PM    HEMATOCRIT 36.1 (L) 02/08/2023 12:27 PM    MCV 99.2 (H) 02/08/2023 12:27 PM    MCH 31.3 02/08/2023 12:27 PM    MCHC 31.6 (L) 02/08/2023 12:27 PM    RDW 48.5 02/08/2023 12:27 PM    PLATELETCT 311 02/08/2023 12:27 PM    MPV 9.8 02/08/2023 12:27 PM    NEUTS 4.92 02/08/2023 12:27 PM    LYMPHOCYTES 16.40 (L) 02/08/2023 12:27 PM    MONOCYTES 14.30 (H) 02/08/2023 12:27 PM    EOSINOPHILS 0.30 02/08/2023 12:27 PM    BASOPHILS 0.10 02/08/2023 12:27 PM     Lab Results   Component Value Date/Time    ASTSGOT 18 02/08/2023 12:27 PM    ALTSGPT 10 02/08/2023 12:27 PM    ALKPHOSPHAT 81 02/08/2023 12:27 PM    TBILIRUBIN 0.5 02/08/2023 12:27 PM    TOTPROTEIN 7.2 02/08/2023 12:27 PM    ALBUMIN 3.9 02/08/2023 12:27 PM     Lab Results   Component Value Date/Time    SODIUM 139 02/08/2023 12:27 PM    POTASSIUM 4.0 02/08/2023 12:27 PM    CHLORIDE 104 02/08/2023 12:27 PM    CO2 22 02/08/2023 12:27 PM    GLUCOSE 87 02/08/2023 12:27 PM    BUN 11 02/08/2023 12:27 PM    CREATININE 0.68 02/08/2023 12:27 PM    CALCIUM 9.4 02/08/2023 12:27 PM     Lab Results   Component Value Date/Time    COLORURINE Yellow 12/16/2012 09:49 PM    SPECGRAVITY 1.016 12/16/2012 09:49 PM    PHURINE 6.5 12/16/2012 09:49 PM    GLUCOSEUR Negative 12/16/2012 09:49 PM    KETONES 80 (A) 12/16/2012 09:49 PM     PROTEINURIN Negative 12/16/2012 09:49 PM     Lab Results   Component Value Date/Time    HEPCAB Non-Reactive 02/08/2023 12:27 PM       RADIOLOGY RESULTS REVIEWED AND INTERPRETED BY ME:  Results for orders placed during the hospital encounter of 03/19/09    DX-KNEES-AP BILATERAL STANDING    Impression  IMPRESSION:    TRACE MEDIAL FEMOROTIBIAL COMPARTMENTAL JOINT SPACE NARROWING BUT NO  MARGINAL OSTEOPHYTE FORMATION.    Results for orders placed during the hospital encounter of 03/14/10    DX-KNEE 2-    Impression  IMPRESSION:    NO EVIDENCE OF FRACTURE.    Results for orders placed during the hospital encounter of 03/19/09    DX-CERVICAL SPINE-2 OR 3 VIEWS    Impression  IMPRESSION:    UNCHANGED, MILD C3-4 AND C5-6 DEGENERATIVE DISC DISEASE.    Results for orders placed during the hospital encounter of 03/14/10    CT-LSPINE W/O PLUS RECONS    Impression  IMPRESSION:    1. NO EVIDENCE OF LUMBAR SPINE FRACTURE.    2. MULTILEVEL MILD DEGENERATIVE DISC DISEASE.    Results for orders placed during the hospital encounter of 03/14/10    CT-TSPINE W/O PLUS RECONS    Impression  IMPRESSION:    1. NO EVIDENCE OF ACUTE THORACIC SPINE FRACTURE.    2. MULTILEVEL DEGENERATIVE DISC DISEASE.    Results for orders placed during the hospital encounter of 08/02/14    MR-KNEE-W/O    Impression  1.  Severe lateral femoral condyle marrow edema with suspected slight articular surface flattening. Alternatively this may just represent severe stress response    2.  Medial femoral condyle weightbearing surface full-thickness cartilage defect with subchondral spurring and small cartilage flap    3.  Intact ligaments and menisci    Results for orders placed during the hospital encounter of 01/04/23    MR-CERVICAL SPINE-WITH & W/O    Impression  1.  Mild discal degenerative changes throughout cervical region with mild marginal osteophytosis.    2.  Minimal to mild multilevel cervical spondylotic change.    3.  No evidence of demyelinating process  in the cervical cord.    Results for orders placed during the hospital encounter of 09/27/11    NM-BONE SCAN WHOLE BODY    Impression  1. No focus of abnormal activity is appreciated that would indicate metastasis at this time.    2. Some elevated activity in the shoulders and feet and ankles is noted that is likely related to degenerative changes.      All relevant laboratory and imaging results reported on this note were reviewed and interpreted by me.         Assessment & Plan     Roxy Sandhu is a 67 y.o. female with history as noted above whose presentation merits the following diagnostic and clinical status impressions and recommendations:    1. Mixed connective tissue disease (HCC)  Clinical picture is compatible with MCTD based on the pattern of her symptomatology and immunologic profile (strongly positive FELIX with anti-Smith/RNP, anti-dsDNA, and IgM anti-CL with no history of hypercoagulability). This disease entity classically encompasses clinical and serologic features of systemic lupus overlapping with inflammatory myopathy or systemic sclerosis. In this case however, she is much more serologically active as her clinical manifestations are less mucocutaneous and musculoskeletal, and more neurologic with involvement of her optic nerve. Presently clinically stable on the current regimen of hydroxychloroquine, so no need for escalation of treatment. Prior to next visit, need to complete her immunologic work-up as noted below for risk stratification purposes.  - Sed Rate; Future  - CRP QUANTITIVE (NON-CARDIAC); Future  - Anti-B2GP1, C3, C4, and CH50 (previously ordered)  - Continue hydroxychloroquine 200 mg daily  - Consider escalation to immunosuppressive therapy to mycophenolate depending on her clinical trajectory     2. Neuromyelitis optica spectrum disorder (HCC)  Diagnosis based on her persistently positive anti-AQP4R which is presumably a part of her broader autoimmune disease process.  -  Established with neuro-ophthalmology and neurology     3. Long-term use of hydroxychloroquine  Minimal to no risk of retinal toxicity given the low dose of hydroxychloroquine prescribed (less than 5 mg/kg of body weight) per rheumatology and ophthalmology guidelines. However, ophthalmologic evaluation is still recommended with the frequency of routine follow-up eye exams determined by the ophthalmologist, typically annually or every other year.  - Routine ophthalmology evaluation as recommended      Note: The above assessment and plan were discussed with the patient who acknowledged understanding of the plan.    FOLLOW-UP: Return in about 8 months (around 12/21/2023) for Short.           Thank you for the opportunity to participate in the care of Roxy Sandhu.    Michael Tellez MD, MS  Rheumatologist, [facility] Rheumatology ? [facility]   of Clinical Medicine, Department of Internal Medicine  Critical access hospital ? [facility]

## 2023-04-22 LAB — ERYTHROCYTE [SEDIMENTATION RATE] IN BLOOD BY WESTERGREN METHOD: 10 MM/HOUR (ref 0–25)

## 2023-04-23 LAB
B2 GLYCOPROT1 IGA SER-ACNC: <10 SAU
B2 GLYCOPROT1 IGG SERPL IA-ACNC: <10 SGU
B2 GLYCOPROT1 IGM SERPL IA-ACNC: <10 SMU

## 2023-04-24 ENCOUNTER — APPOINTMENT (OUTPATIENT)
Dept: NEUROLOGY | Facility: MEDICAL CENTER | Age: 68
End: 2023-04-24
Attending: PSYCHIATRY & NEUROLOGY
Payer: MEDICARE

## 2023-04-24 LAB — CH50 SERPL-ACNC: 85.1 U/ML (ref 38.7–89.9)

## 2023-04-28 DIAGNOSIS — M35.1 MIXED CONNECTIVE TISSUE DISEASE (HCC): ICD-10-CM

## 2023-04-28 RX ORDER — HYDROXYCHLOROQUINE SULFATE 200 MG/1
400 TABLET, FILM COATED ORAL DAILY
COMMUNITY
End: 2023-04-28 | Stop reason: SDUPTHER

## 2023-04-28 NOTE — TELEPHONE ENCOUNTER
Received request via: Patient    Was the patient seen in the last year in this department? Yes    Does the patient have an active prescription (recently filled or refills available) for medication(s) requested? No    Does the patient have [facility] and need 100 day supply (blood pressure, diabetes and cholesterol meds only)? Medication is not for cholesterol, blood pressure or diabetes and Patient does not have SCP

## 2023-04-29 RX ORDER — HYDROXYCHLOROQUINE SULFATE 200 MG/1
200 TABLET, FILM COATED ORAL DAILY
Qty: 90 TABLET | Refills: 3 | Status: SHIPPED | OUTPATIENT
Start: 2023-04-29

## 2023-05-09 ENCOUNTER — OFFICE VISIT (OUTPATIENT)
Dept: OPHTHALMOLOGY | Facility: MEDICAL CENTER | Age: 68
End: 2023-05-09
Payer: MEDICARE

## 2023-05-09 DIAGNOSIS — Z96.1 PSEUDOPHAKIA OF LEFT EYE: ICD-10-CM

## 2023-05-09 DIAGNOSIS — H35.3190 AGE-RELATED MACULAR DEGENERATION WITH CENTRAL GEOGRAPHIC ATROPHY: ICD-10-CM

## 2023-05-09 DIAGNOSIS — M35.1 MIXED CONNECTIVE TISSUE DISEASE (HCC): ICD-10-CM

## 2023-05-09 DIAGNOSIS — Z79.899 LONG-TERM USE OF HYDROXYCHLOROQUINE: ICD-10-CM

## 2023-05-09 DIAGNOSIS — H46.9 OPTIC NEURITIS: ICD-10-CM

## 2023-05-09 PROCEDURE — 92250 FUNDUS PHOTOGRAPHY W/I&R: CPT | Performed by: OPHTHALMOLOGY

## 2023-05-09 PROCEDURE — 99214 OFFICE O/P EST MOD 30 MIN: CPT | Mod: 25 | Performed by: OPHTHALMOLOGY

## 2023-05-09 RX ORDER — LEVOTHYROXINE SODIUM 0.2 MG/1
TABLET ORAL
COMMUNITY
Start: 2023-04-20

## 2023-05-09 ASSESSMENT — EXTERNAL EXAM - RIGHT EYE: OD_EXAM: NORMAL

## 2023-05-09 ASSESSMENT — TONOMETRY
IOP_METHOD: I-CARE
OD_IOP_MMHG: 17
OS_IOP_MMHG: 14

## 2023-05-09 ASSESSMENT — CONF VISUAL FIELD
OD_SUPERIOR_TEMPORAL_RESTRICTION: 1
OD_INFERIOR_TEMPORAL_RESTRICTION: 1
OS_NORMAL: 1
OS_INFERIOR_NASAL_RESTRICTION: 0
OS_SUPERIOR_NASAL_RESTRICTION: 0
OS_SUPERIOR_TEMPORAL_RESTRICTION: 0
OS_INFERIOR_TEMPORAL_RESTRICTION: 0
OD_SUPERIOR_NASAL_RESTRICTION: 1
OD_INFERIOR_NASAL_RESTRICTION: 1

## 2023-05-09 ASSESSMENT — SLIT LAMP EXAM - LIDS
COMMENTS: NORMAL
COMMENTS: NORMAL

## 2023-05-09 ASSESSMENT — REFRACTION_MANIFEST
OS_AXIS: 138
OD_SPHERE: +0.00
OS_SPHERE: -0.50
OS_CYLINDER: +0.50
METHOD_AUTOREFRACTION: 1
OD_CYLINDER: +0.25
OD_AXIS: 094

## 2023-05-09 ASSESSMENT — ENCOUNTER SYMPTOMS
EYE DISCHARGE: 1
BLURRED VISION: 1
PHOTOPHOBIA: 1

## 2023-05-09 ASSESSMENT — VISUAL ACUITY
METHOD: SNELLEN - LINEAR
OS_SC: 20/25
OD_SC: HM

## 2023-05-09 ASSESSMENT — CUP TO DISC RATIO: OD_RATIO: 0.2

## 2023-05-09 ASSESSMENT — EXTERNAL EXAM - LEFT EYE: OS_EXAM: NORMAL

## 2023-05-09 NOTE — ASSESSMENT & PLAN NOTE
12/21/2022-referred by [facility] for acute vision loss in the right November 5.  She was then seen at [facility] as well by Dr. 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  However he did not notice any disc edema.  She did undergo an MRI scan that was unfortunately done without contrast that demonstrated some optic nerve sheath dilation.  On examination today her visual acuity is light perception in the right eye and there is a right afferent pupillary defect.  An OCT of the optic nerve thickness is normal at 89 in the right and 72 in the left with normal ganglion cell on the right.  Therefore this history is not consistent with a nonarteritic ischemic optic neuropathy and that went over the expected continued swelling noted on dilated examination 10 days later.  This could easily be consistent with a an acute central retinal artery occlusion.  Trevor York did a fluorescein angiogram but I do not have any results to that effect.  However given the optic nerve sheath dilation on MRI scan this could be consistent with a retrobulbar optic neuritis.  However the MRI scan was done without contrast so one does not know where the optic nerve would enhance.  I am therefore we are ordering an MRI scan with gadolinium as well as ordering routine blood work for which she has not done in a while.  Also to obtain further serological testing as well as CT angiogram of the head and neck.  Sending a note back to Dr. York insofar as if he never did a fluorescein angiogram this might help in securing a diagnosis.  1/3/2022 - Review of MRI and labs thus far. MRI demonstrates enhancement to the right optic nerve. MOG - negative, but aquaporin 4 -0 elevated. ACE normal but FELIX extremely high at 1:2560. T4, T3 and Free T4 all low (no TSH). Thus discussed at length with patient. Since still enhancement of the optic nerve would recommend admission to [facility]  for at least 3 days of high dose IV solumedrol 1 gm per day. Needs TSH, connective tissue disease profile, lupus anticoagulant, anti phospholipid antibody, (Rheumatology Consult) PT, PTT, MRI cervical, thoracis and lumbar spine with contrast looking for other enhancing lesion, Lumbar puncture with cytology (history of breast CA), oligoclonal bands and myelin basic protein (Neurologiy consult). If no improvement in the vision on the IV solumedrol and work up pointing to a demyelinating disease then should consider IVIG or PLEX. Discussed with patient. Is getting CTA head and neck tomoroow late morning so recommended that after she present to the [facility] emergency room to initiate work up and get admitted.  1/10/2023-was admitted to [facility] for 3 days of IV steroids.  Also further work-up.  Following the steroids her vision has improved to counting fingers.  Her work-up is extensive and did not reveal any enhancing lesions involving her cervical thoracic or lumbar spine.  However her aquaporin 4 antibody was positive.  Myelin basic protein as well as oligoclonal bands were negative but her CSF protein was 69.  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  I restarted prednisone at 60 mg a day to taper by 20 mg over 3 weeks.  I would like to get her seen by neuro immunology to determine further treatments.  2/1/2023 - Has yet to see neuroimmunology. Saw Rheumatology who diagnosed MCTD and placed on Plaquenil. Given that aquaporin 4 b positive and the retrobulbar optic neuritis and elevated CSF protein would like to also have her se neuroimmunology to determine if they think other treatment would be required.  Of note she may be developing slight atrophy involving the right optic nerve head.  Her OCT is slightly down to 73 OD OS stable at 76 but her optic nerve is very anomalous on that side.  5/9/2023-saw Dr. Escobar in neuro immunology consult.  Confirmed diagnosis of aquaporn 4 positive optic neuropathy.  He  wanted to start satralizumab but patient hesitant because it is a chronic subq injection.  I discussed that given the degree of vision loss in the right eye be important to be proactive and preventative.  I recommended that she recontact Dr. Escobar.  Overall her optic nerve head appears stable and function.  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  Her OCT nerve fiber layer thickness is 67 OD and 48 OS today

## 2023-05-09 NOTE — ASSESSMENT & PLAN NOTE
1/10/2023-and her further work-up she was found to have an extremely elevated antinuclear antibody.  Her Roth antibody was also somewhat elevated as well as anticardiolipin antibodies.  We will obtain a double-stranded DNA refer to rheumatology help better characterize her autoimmune process and also possibly to make further recommendations for treatments in association with her NMO.  2/1/2023 - diagnosed with MCTD and placed on plaquinel  5/9/2023-being monitored by Dr. Tellez

## 2023-05-09 NOTE — ASSESSMENT & PLAN NOTE
12/21/2022-significantly tilted optic nerve on the left with geographic atrophy.  OCT demonstrates significant RPE changes as well as macular hole.  We will relay this information to Dr. York.  Uncertain as to whether highly myopic prior to cataract extraction on the left  5/9/2023-overall stable geographic atrophy with macular hole

## 2023-05-09 NOTE — ASSESSMENT & PLAN NOTE
2/1/2023-we will need to monitor in future any toxicity involving the macula.  5/9/2023-other than geographic atrophy and macular hole do not see any change in the outer retinal layers consistent with Plaquenil toxicity

## 2023-05-09 NOTE — PROGRESS NOTES
Peds/Neuro Ophthalmology:   John Pantoja M.D.    Date & Time note created:    5/9/2023   4:23 PM     Referring MD / APRN:  Nisa Lloyd D.N.P., No att. providers found    Patient ID:  Name:             Roxy Sandhu     YOB: 1955  Age:                 67 y.o.  female   MRN:               [id number]    Chief Complaint/Reason for Visit:     Other (3 month f.v. Optic neuritis)      History of Present Illness:    Roxy Sandhu is a 67 y.o. female   3 month follow up for optic neuritis. Pt is with spouse today. Pt states that she's very light sensitive. Pt has very watery eyes. Pt states that she has very poor vision in her right eye. Pt also states that she has noticed her vision has decreased in her left eye within the last year. No headaches. No pain or discomfort OU.      Review of Systems:  Review of Systems   Eyes:  Positive for blurred vision, photophobia and discharge.        Optic Neuritis OU     Past Medical History:   Past Medical History:   Diagnosis Date    Breast cancer (HCC)     Breath shortness     Cancer (HCC) 2011    breast       Past Surgical History:  Past Surgical History:   Procedure Laterality Date    LUMPECTOMY  09/02/2011    Performed by DOREEN GARCIA at SURGERY [facility] DAY [facility]    NODE BIOPSY SENTINEL  09/02/2011    Performed by DOREEN GARCIA at SURGERY SAME DAY [facility]    EYE SURGERY         Current Outpatient Medications:  Current Outpatient Medications   Medication Sig Dispense Refill    levothyroxine (SYNTHROID) 200 MCG Tab TAKE 1 TABLET BY MOUTH EVERY MORNING ON AN EMPTY STOMACH FOR 30 DAYS.      hydroxychloroquine (PLAQUENIL) 200 MG Tab Take 1 Tablet by mouth every day. 90 Tablet 3     No current facility-administered medications for this visit.       Allergies:  Allergies   Allergen Reactions    Bloodless        Family History:  Family History   Problem Relation Age of Onset    Diabetes Mother        Social History:  Social History      Socioeconomic History    Marital status: Single     Spouse name: Not on file    Number of children: Not on file    Years of education: Not on file    Highest education level: Not on file   Occupational History    Not on file   Tobacco Use    Smoking status: Never    Smokeless tobacco: Never   Vaping Use    Vaping Use: Never used   Substance and Sexual Activity    Alcohol use: Not Currently     Comment: Occasional     Drug use: Yes     Frequency: 4.0 times per week     Types: Marijuana    Sexual activity: Not on file   Other Topics Concern    Not on file   Social History Narrative    disabled     Social Determinants of Health     Financial Resource Strain: Not on file   Food Insecurity: Not on file   Transportation Needs: Not on file   Physical Activity: Not on file   Stress: Not on file   Social Connections: Not on file   Intimate Partner Violence: Not on file   Housing Stability: Not on file          Physical Exam:  Physical Exam    Oriented x 3  Weight/BMI: There is no height or weight on file to calculate BMI.  There were no vitals taken for this visit.    Base Eye Exam       Visual Acuity (Snellen - Linear)         Right Left    Dist sc HM 20/25              Tonometry (i-care, 1:41 PM)         Right Left    Pressure 17 14              Pupils         React APD    Right Minimal +3    Left                Visual Fields         Right Left      Full                                Extraocular Movement         Right Left     Full, Ortho Full, Ortho              Neuro/Psych       Oriented x3: Yes    Mood/Affect: difficult historian                  Additional Tests       Color         Right Left    Ishihara 0/12 12/12              Stereo       Fly: -    Animals: 1/3    Circles: 3/9                  Slit Lamp and Fundus Exam       External Exam         Right Left    External Normal Normal              Slit Lamp Exam         Right Left    Lids/Lashes Normal Normal    Conjunctiva/Sclera White and quiet White and quiet     Cornea Clear Clear    Anterior Chamber Deep and quiet Deep and quiet    Iris Round and reactive Round and reactive    Lens Clear Posterior chamber intraocular lens    Vitreous Normal Normal              Fundus Exam         Right Left    Disc ? slight pallor Tilted, with chichi pap staphyloma    C/D Ratio 0.2     Macula Normal RPE degeneration    Vessels Normal Normal    Periphery Normal Normal                  Refraction       Manifest Refraction (Auto)         Sphere Cylinder Axis    Right +0.00 +0.25 094    Left -0.50 +0.50 138              Final Rx         Sphere Cylinder Axis Add    Right +0.00   +3.00    Left -0.50 +0.50 138 +3.00                    Pertinent Lab/Test/Imaging Review:      Assessment and Plan:     Optic neuritis  12/21/2022-referred by [facility] for acute vision loss in the right November 5.  She was then seen at [facility] as well by Dr. 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  However he did not notice any disc edema.  She did undergo an MRI scan that was unfortunately done without contrast that demonstrated some optic nerve sheath dilation.  On examination today her visual acuity is light perception in the right eye and there is a right afferent pupillary defect.  An OCT of the optic nerve thickness is normal at 89 in the right and 72 in the left with normal ganglion cell on the right.  Therefore this history is not consistent with a nonarteritic ischemic optic neuropathy and that went over the expected continued swelling noted on dilated examination 10 days later.  This could easily be consistent with a an acute central retinal artery occlusion.  Trevor York did a fluorescein angiogram but I do not have any results to that effect.  However given the optic nerve sheath dilation on MRI scan this could be consistent with a retrobulbar optic neuritis.  However the MRI scan was done without contrast so one does  not know where the optic nerve would enhance.  I am therefore we are ordering an MRI scan with gadolinium as well as ordering routine blood work for which she has not done in a while.  Also to obtain further serological testing as well as CT angiogram of the head and neck.  Sending a note back to Dr. York insofar as if he never did a fluorescein angiogram this might help in securing a diagnosis.  1/3/2022 - Review of MRI and labs thus far. MRI demonstrates enhancement to the right optic nerve. MOG - negative, but aquaporin 4 -0 elevated. ACE normal but FELIX extremely high at 1:2560. T4, T3 and Free T4 all low (no TSH). Thus discussed at length with patient. Since still enhancement of the optic nerve would recommend admission to [facility] for at least 3 days of high dose IV solumedrol 1 gm per day. Needs TSH, connective tissue disease profile, lupus anticoagulant, anti phospholipid antibody, (Rheumatology Consult) PT, PTT, MRI cervical, thoracis and lumbar spine with contrast looking for other enhancing lesion, Lumbar puncture with cytology (history of breast CA), oligoclonal bands and myelin basic protein (Neurologiy consult). If no improvement in the vision on the IV solumedrol and work up pointing to a demyelinating disease then should consider IVIG or PLEX. Discussed with patient. Is getting CTA head and neck tomoroow late morning so recommended that after she present to the [facility] emergency room to initiate work up and get admitted.  1/10/2023-was admitted to [facility] for 3 days of IV steroids.  Also further work-up.  Following the steroids her vision has improved to counting fingers.  Her work-up is extensive and did not reveal any enhancing lesions involving her cervical thoracic or lumbar spine.  However her aquaporin 4 antibody was positive.  Myelin basic protein as well as oligoclonal bands were negative but her CSF protein was 69.  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  I restarted prednisone  at 60 mg a day to taper by 20 mg over 3 weeks.  I would like to get her seen by neuro immunology to determine further treatments.  2/1/2023 - Has yet to see neuroimmunology. Saw Rheumatology who diagnosed MCTD and placed on Plaquenil. Given that aquaporin 4 b positive and the retrobulbar optic neuritis and elevated CSF protein would like to also have her se neuroimmunology to determine if they think other treatment would be required.  Of note she may be developing slight atrophy involving the right optic nerve head.  Her OCT is slightly down to 73 OD OS stable at 76 but her optic nerve is very anomalous on that side.  5/9/2023-saw Dr. Escobar in neuro immunology consult.  Confirmed diagnosis of aquaporn 4 positive optic neuropathy.  He wanted to start satralizumab but patient hesitant because it is a chronic subq injection.  I discussed that given the degree of vision loss in the right eye be important to be proactive and preventative.  I recommended that she recontact Dr. Escobar.  Overall her optic nerve head appears stable and function.  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  Her OCT nerve fiber layer thickness is 67 OD and 48 OS today    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  12/21/2022-significantly tilted optic nerve on the left with geographic atrophy.  OCT demonstrates significant RPE changes as well as macular hole.  We will relay this information to Dr. York.  Uncertain as to whether highly myopic prior to cataract extraction on the left  5/9/2023-overall stable geographic atrophy with macular hole    Long-term use of hydroxychloroquine  2/1/2023-we will need to monitor in future any toxicity involving the macula.  5/9/2023-other than geographic atrophy and macular hole do not see any change in the outer retinal layers consistent with Plaquenil toxicity    Mixed connective tissue  disease (HCC)  1/10/2023-and her further work-up she was found to have an extremely elevated antinuclear antibody.  Her Roth antibody was also somewhat elevated as well as anticardiolipin antibodies.  We will obtain a double-stranded DNA refer to rheumatology help better characterize her autoimmune process and also possibly to make further recommendations for treatments in association with her NMO.  2/1/2023 - diagnosed with MCTD and placed on plaquinel  5/9/2023-being monitored by Dr. Tellez    Pseudophakia of left eye  12/21/2022-pseudophakia left eye.  IOL intact  5/9/2023-IOL intact      John Pantoja M.D.

## 2023-05-15 ENCOUNTER — OFFICE VISIT (OUTPATIENT)
Dept: NEUROLOGY | Facility: MEDICAL CENTER | Age: 68
End: 2023-05-15
Attending: PSYCHIATRY & NEUROLOGY
Payer: MEDICARE

## 2023-05-15 ENCOUNTER — TELEPHONE (OUTPATIENT)
Dept: NEUROLOGY | Facility: MEDICAL CENTER | Age: 68
End: 2023-05-15

## 2023-05-15 VITALS
SYSTOLIC BLOOD PRESSURE: 130 MMHG | HEART RATE: 84 BPM | WEIGHT: 151 LBS | RESPIRATION RATE: 16 BRPM | DIASTOLIC BLOOD PRESSURE: 72 MMHG | HEIGHT: 63 IN | BODY MASS INDEX: 26.75 KG/M2 | TEMPERATURE: 98.5 F | OXYGEN SATURATION: 96 %

## 2023-05-15 DIAGNOSIS — G36.0 NEUROMYELITIS OPTICA SPECTRUM DISORDER (HCC): Primary | ICD-10-CM

## 2023-05-15 DIAGNOSIS — H46.9 OPTIC NEURITIS, RIGHT: ICD-10-CM

## 2023-05-15 PROCEDURE — 3075F SYST BP GE 130 - 139MM HG: CPT | Performed by: PSYCHIATRY & NEUROLOGY

## 2023-05-15 PROCEDURE — 99215 OFFICE O/P EST HI 40 MIN: CPT | Performed by: PSYCHIATRY & NEUROLOGY

## 2023-05-15 PROCEDURE — 3078F DIAST BP <80 MM HG: CPT | Performed by: PSYCHIATRY & NEUROLOGY

## 2023-05-15 PROCEDURE — 99211 OFF/OP EST MAY X REQ PHY/QHP: CPT | Performed by: PSYCHIATRY & NEUROLOGY

## 2023-05-15 RX ORDER — SATRALIZUMAB 120 MG/ML
120 INJECTION, SOLUTION SUBCUTANEOUS
Qty: 1.12 ML | Refills: 11 | Status: SHIPPED | OUTPATIENT
Start: 2023-05-15 | End: 2023-05-16

## 2023-05-15 ASSESSMENT — FIBROSIS 4 INDEX: FIB4 SCORE: 1.19

## 2023-05-15 NOTE — TELEPHONE ENCOUNTER
did an eligibility check, no Insurance benefits found. Called [facility] pharmacy @[phone] get rx benefits, spoke to Keo, no rx benefits on file. member uses discount card.

## 2023-05-15 NOTE — PROGRESS NOTES
"[facility] NEUROLOGY  MULTIPLE SCLEROSIS & NEUROIMMUNOLOGY  FOLLOW-UP VISIT    DISEASE SUMMARY:  Principal neurologic diagnosis: anti-AQP4 Ab-positive NMOSD  Diagnosis of MS: 12/21/2022  Disease History:  - 11/5/2022: acute-onset right monocular blindness w/o associated pain  - 1/10/2023: admitted at [facility]; treated with IVMP x3 days, minimal improvement  Disease course at onset: n/a  Current disease course: n/a  Previous disease therapies:  - none  Current disease therapies:  - none  Symptomatic therapies:  - none  CSF (1/5/2023):  - OCBs: 0  Other Testing:  - anti-AQP4 Ab ([facility], 2/8/2023): 1:160  - anti-AQP4 Ab (12/21/2022): 28.2  - anti-MOG Ab (2/8/2023): negative  - anti-MOG Ab (12/21/2022): <1:10  MRI head:  - no dedicated brain scans to date  MRI orbits:  - 12/22/2022: \"abnormal enhancement of the retro-orbital portion of the right optic nerve consistent with acute optic neuritis...\"  - 12/6/2022: \"focal increased T1 signal intensity in the right retro-orbital portion of the right optic nerve...\" (contrast not administered)  MRI cervical spine:  - 1/4/2023: no visible lesions  MRI thoracic spine:  - 1/4/2023: no visible lesions  Immunizations:  - influenza?:   - Pneumonia?:  - SARS-CoV-2?:   Cancer Screens:  - mammogram:   - PAP?:   - skin check:   Immunizations:  - influenza?:   - Pneumonia?:  - SARS-CoV-2?:   Cancer Screens:  - mammogram:   - PAP?:   - skin check:     CC: anti-AQP4 Ab-positive NMO-SD    INTERVAL HISTORY:  Roxy Sandhu is a 67 y.o. woman with anti-AQP4 Ab-positive NMOSD and a history otherwise notable for optic neuritis (right), hypothyroidism, macular degeneration (age-related), breast cancer, and MCTD (mixed connective tissue disease).  [facility] on [facility]10[facility]2023.  At that time I recommended she start Enspryng (satralizumab).  Today, she was accompanied by her common-law , and she provided the following interval history:    5/12/2023:  Roxy developed left lower " extremity heaviness.  She attributes this to having overdone it while gardening.    Otherwise, she has not noticed any new or worsened neurologic symptoms since the last visit.    MEDICATIONS:  Current Outpatient Medications   Medication Sig    Satralizumab-mwge (ENSPRYNG) 120 MG/ML Solution Prefilled Syringe Inject 120 mg under the skin every 30 (thirty) days for 1 day.    levothyroxine (SYNTHROID) 200 MCG Tab TAKE 1 TABLET BY MOUTH EVERY MORNING ON AN EMPTY STOMACH FOR 30 DAYS.    hydroxychloroquine (PLAQUENIL) 200 MG Tab Take 1 Tablet by mouth every day.     MEDICAL, SOCIAL, AND FAMILY HISTORY:  There is no change in the patient's ROS or medical, social, or family histories since the previous visit on 3/10/2023.    REVIEW OF SYSTEMS:  A ROS was completed.  Pertinent positives and negatives were included in the HPI, above.  All other systems were reviewed and are negative.    PHYSICAL EXAM:  General/Medical:  - NAD    Neuro:  MENTAL STATUS: awake and alert; no deficits of speech or language; oriented to conversation; tearful several times throughout the encounter    CRANIAL NERVES:    II: acuity: NT, fields: NT, pupils: RAPD on the right, discs: NT    III/IV/VI: versions: grossly intact    V: facial sensation: NT    VII: facial expression: symmetric    VIII: hearing: intact to voice    IX/X: palate: NT    XI: shoulder shrug: NT    XII: tongue: NT    MOTOR:  - bulk: NT  - tone: NT  Upper Extremity Strength  (R/L)    5/5   Elbow flexion 5/5   Elbow extension 5/5   Shoulder abduction NT     Lower Extremity Strength  (R/L)   Hip flexion 5/5   Knee extension 5/4 (sore)   Knee flexion NT   Ankle plantarflexion NT   Ankle dorsiflexion NT     - pronator drift: NT  - abnormal movements: none    SENSATION:  - light touch: NT  - vibration (R/L, seconds): NT at the great toes  - pinprick: NT  - proprioception: NT  - Romberg: absent    COORDINATION:  - finger to nose: NT  - finger tapping: NT    REFLEXES:  Reflex Right  "Left   BR NT NT   Biceps NT NT   Triceps NT NT   Patellae NT NT   Achilles NT NT   Toes NT NT     GAIT:  - antalgic    REVIEW OF IMAGING STUDIES:  No additional data since the last visit.    REVIEW OF LABORATORY STUDIES:  I have summarized pertinent data above.    ASSESSMENT:  Roxy Sandhu is a 67 y.o. woman with anti-AQP4 Ab-positive NMOSD and a history otherwise notable for optic neuritis (right), hypothyroidism, macular degeneration (age-related), breast cancer, and MCTD (mixed connective tissue disease).  We again discussed immunotherapy treatment with satralizumab.  She signed the start form.    PLAN:  NMO-SD (anti-AQP4 Ab-positive)  - plan to start Enspryng (satralizumab)    Follow-Up:  - Return in about 2 months (around 7/15/2023).    Signed: Crescencio Escobar M.D.    BILLING DOCUMENTATION:   I spent 65 minutes reviewing the medical record, interviewing and examining the patient, discussing my impression (see \"assessment\" above), and coordinating care.  "

## 2023-06-29 ENCOUNTER — APPOINTMENT (OUTPATIENT)
Dept: NEUROLOGY | Facility: MEDICAL CENTER | Age: 68
End: 2023-06-29
Attending: PSYCHIATRY & NEUROLOGY
Payer: MEDICARE

## 2023-12-19 ENCOUNTER — APPOINTMENT (OUTPATIENT)
Dept: RHEUMATOLOGY | Facility: MEDICAL CENTER | Age: 68
End: 2023-12-19
Attending: STUDENT IN AN ORGANIZED HEALTH CARE EDUCATION/TRAINING PROGRAM
Payer: MEDICARE

## 2024-05-24 ENCOUNTER — TELEPHONE (OUTPATIENT)
Dept: HEALTH INFORMATION MANAGEMENT | Facility: OTHER | Age: 69
End: 2024-05-24